# Patient Record
Sex: FEMALE | Race: WHITE | Employment: FULL TIME | URBAN - METROPOLITAN AREA
[De-identification: names, ages, dates, MRNs, and addresses within clinical notes are randomized per-mention and may not be internally consistent; named-entity substitution may affect disease eponyms.]

---

## 2017-08-15 ENCOUNTER — HOSPITAL ENCOUNTER (OUTPATIENT)
Dept: PHYSICAL THERAPY | Age: 62
Discharge: HOME | End: 2017-08-15
Payer: COMMERCIAL

## 2017-08-15 PROCEDURE — 97110 THERAPEUTIC EXERCISES: CPT

## 2017-08-15 PROCEDURE — 97161 PT EVAL LOW COMPLEX 20 MIN: CPT

## 2017-08-15 NOTE — PROGRESS NOTES
[facility] PHYSICAL THERAPY AT [facility] [address] [place], [address] - Phone: [phone]  Fax: [phone] / [address] [facility]  Patient Name: Nina Quiñones : 1955   Medical   Diagnosis: Cervicalgia [M54.2] Treatment Diagnosis: Cervical pain   Onset Date:      Referral Source: Charlotte Good MD Start of Care [facility]): 8/15/2017   Prior Hospitalization: See medical history Provider #: [id number]   Prior Level of Function: ADLs without neck pain or dizziness   Comorbidities: Hep C, renal disease, LBP   Medications: Verified on Patient Summary List     The Plan of Care and following information is based on the information from the initial evaluation.   ===========================================================================================  Assessment / rothman information:   Nina Quiñones is a 58 y.o.  yo female with Dx of Cervicalgia [M54.2]. .  She currently rates neck pain as 8/10 at worst, 4/10 at best, primarily located at base of neck. Dizziness reported x 3 months, worsening with rotation and bending forward. Neck pain is worse with rotation, lifting items and reaching for items [place] at work. Objective Findings:  Cervical ROM: Flx  = 10 deg, Ext = 20deg, Lat Flx; R = 20, L = 25, Rot: R = 65, L =45 . Manual Muscle Testing: 3+/5 MT, LT and ER. Posture: Significant FHP. Special Test:  + Compression and Distraction.  Pt instructed in HEP and will f/u in clinic for PT.  ===========================================================================================  Eval Complexity: History MEDIUM  Complexity : 1-2 comorbidities / personal factors will impact the outcome/ POC ;  Examination  MEDIUM Complexity : 3 Standardized tests and measures addressing body structure, function, activity limitation and / or participation in recreation ; Presentation MEDIUM Complexity : Evolving with changing characteristics ; Decision Making MEDIUM Complexity : FOTO score of 26-74; Overall Complexity MEDIUM  Problem List: pain affecting function, decrease ROM, decrease strength, decrease ADL/ functional abilitiies, decrease activity tolerance, decrease flexibility/ joint mobility and decrease transfer abilities   Treatment Plan may include any combination of the following: Therapeutic exercise, Therapeutic activities, Neuromuscular re-education, Physical agent/modality, Manual therapy, Aquatic therapy, Patient education, Functional mobility training and Stair training  Patient / Family readiness to learn indicated by: asking questions, trying to perform skills and interest  Persons(s) to be included in education: patient (P)  Barriers to Learning/Limitations: no  Measures taken:  None needed   Patient Goal (s): To decrease pain   Rehabilitation Potential: good   Short Term Goals: To be accomplished in  1-2  weeks:  1. Independent with HEP. 2. Decrease max pain 25-50% to assist with return to full schedule at work. 3. Improve FOTO score by 4 points to show functional improvement.  Long Term Goals: To be accomplished in  3-4  weeks:  1. Decrease max pain 50-75% to assist with HEP. 2. Improve FOTO score by 9 points to show functional improvement. 3.  Will rate a +5 on Global Rating of Change and be prepared to DC to HEP. Frequency / Duration:   Patient to be seen  2-3  times per week for 3-4  weeks:  Patient / Caregiver education and instruction: self care and exercises  G-Codes (GP): n/a  Therapist Signature: Danielle Taveras PT Date: 3/76/1757   Certification Period: n/a Time: 4:19 PM   ===========================================================================================  I certify that the above Physical Therapy Services are being furnished while the patient is under my care. I agree with the treatment plan and certify that this therapy is necessary.     Physician Signature:        Date: Time:     Please sign and return to [facility] at [address] or you may fax the signed copy to [phone]. Thank you.

## 2017-08-15 NOTE — PROGRESS NOTES
PHYSICAL THERAPY - DAILY TREATMENT NOTE    Patient Name: Joan [facility]        Date: 8/15/2017  : 1955   YES Patient  Verified  Visit #:   1   of   4  Insurance: Payor: Gavin Peppers / Plan: 50 [facility] / Product Type: Commerical /      In time: 410 Out time: 5   Total Treatment Time: 50     Medicare Time Tracking (below)   Total Timed Codes (min):  15 1:1 Treatment Time:  50     TREATMENT AREA =  Dizziness and neck pain    SUBJECTIVE                                                         See IE            OBJECTIVE    15 min Therapeutic Exercise:  [x]  See flow sheet   Rationale:      increase ROM and increase strength to improve the patients ability to perform ADLs    T/o tx min Patient Education:  YES  Reviewed HEP   [] A and P related to current DX [] LTGs and POC   []  Progressed/Changed HEP based on:         Other Objective/Functional Measures:                                   See IE           ASSESSMENT    [x]  See Initial Evaluation           PLAN    []  Upgrade activities as tolerated YES Continue plan of care   []  Discharge due to :    []  Other: Pt will return for follow up and to initiate POC     Therapist: Darci Moses, PT, [address]    Date: 8/15/2017 Time: 4:17 PM

## 2017-10-17 NOTE — PROGRESS NOTES
[place] PHYSICAL THERAPY AT [facility] [address]. [address]  Phone: [phone]  Fax: [phone] SUMMARY  Patient Name: Saida Urrutia : 1955   Treatment/Medical Diagnosis: Cervicalgia [M54.2]   Referral Source: Juan Luis Duran MD     Date of Initial Visit: 8/15/17 Attended Visits: 1 Missed Visits: 0     SUMMARY OF TREATMENT  Initial evaluation and therapeutic exercise, pt ed. CURRENT STATUS  Unknown. GOAL STATUS  Unable to formally reassess progress toward goals secondary to pt not returning after initial evaluation. RECOMMENDATIONS  Discontinue therapy due to lack of attendance or compliance. If you have any questions/comments please contact us directly at [phone]. Thank you for allowing us to assist in the care of your patient.     Therapist Signature: Clayborne Nageotte, PT Date: 10/17/17   Reporting Period:   8/15/17 Time: 1:29 PM

## 2018-05-04 ENCOUNTER — HOSPITAL ENCOUNTER (OUTPATIENT)
Dept: LAB | Age: 63
Discharge: HOME | End: 2018-05-04
Payer: COMMERCIAL

## 2018-05-04 ENCOUNTER — HOSPITAL ENCOUNTER (OUTPATIENT)
Dept: OTHER | Age: 63
Discharge: HOME | End: 2018-05-04
Payer: COMMERCIAL

## 2018-05-04 ENCOUNTER — HOSPITAL ENCOUNTER (OUTPATIENT)
Dept: LAB | Age: 63
Discharge: HOME | End: 2018-05-04

## 2018-05-04 ENCOUNTER — HOSPITAL ENCOUNTER (OUTPATIENT)
Dept: PREADMISSION TESTING | Age: 63
Discharge: HOME | End: 2018-05-04
Payer: COMMERCIAL

## 2018-05-04 DIAGNOSIS — Z01.818 PRE-OP TESTING: ICD-10-CM

## 2018-05-04 DIAGNOSIS — Z01.811 PRE-OP CHEST EXAM: ICD-10-CM

## 2018-05-04 LAB
ABO + RH BLD: NORMAL
ATRIAL RATE: 50 BPM
BLOOD GROUP ANTIBODIES SERPL: NORMAL
CALCULATED P AXIS, ECG09: 78 DEGREES
CALCULATED R AXIS, ECG10: 73 DEGREES
CALCULATED T AXIS, ECG11: 78 DEGREES
DIAGNOSIS, 93000: NORMAL
P-R INTERVAL, ECG05: 150 MS
Q-T INTERVAL, ECG07: 490 MS
QRS DURATION, ECG06: 84 MS
QTC CALCULATION (BEZET), ECG08: 446 MS
SENTARA SPECIMEN COL,SENBCF: NORMAL
SPECIMEN EXP DATE BLD: NORMAL
VENTRICULAR RATE, ECG03: 50 BPM

## 2018-05-04 PROCEDURE — 86900 BLOOD TYPING SEROLOGIC ABO: CPT | Performed by: NEUROLOGICAL SURGERY

## 2018-05-04 PROCEDURE — 93005 ELECTROCARDIOGRAM TRACING: CPT

## 2018-05-04 PROCEDURE — 71046 X-RAY EXAM CHEST 2 VIEWS: CPT

## 2018-05-04 PROCEDURE — 99001 SPECIMEN HANDLING PT-LAB: CPT | Performed by: NEUROLOGICAL SURGERY

## 2018-05-04 RX ORDER — ESCITALOPRAM OXALATE 20 MG/1
20 TABLET ORAL DAILY
COMMUNITY
End: 2021-01-12

## 2018-05-04 RX ORDER — OMEPRAZOLE 40 MG/1
40 CAPSULE, DELAYED RELEASE ORAL DAILY
COMMUNITY
End: 2022-09-28

## 2018-05-04 NOTE — PERIOP NOTES
PAT - SURGICAL PRE-ADMISSION INSTRUCTIONS    NAME:  Be Cochran                                                          TODAY'S DATE:  5/4/2018    SURGERY DATE:  5/9/2018                                  SURGERY ARRIVAL TIME:   0530    1. Do NOT eat or drink anything, including candy or gum, after MIDNIGHT on 05/08/18 , unless you have specific instructions from your Surgeon or Anesthesia Provider to do so. 2. No smoking on the day of surgery. 3. No alcohol 24 hours prior to the day of surgery. 4. No recreational drugs for one week prior to the day of surgery. 5. Leave all valuables, including money/purse, at home. 6. Remove all jewelry, nail polish, makeup (including mascara); no lotions, powders, deodorant, or perfume/cologne/after shave. 7. Glasses/Contact lenses and Dentures may be worn to the hospital.  They will be removed prior to surgery. 8. Call your doctor if symptoms of a cold or illness develop within 24 ours prior to surgery. 9. AN ADULT MUST DRIVE YOU HOME AFTER OUTPATIENT SURGERY. 10. If you are having an OUTPATIENT procedure, please make arrangements for a responsible adult to be with you for 24 hours after your surgery. 11. If you are admitted to the hospital, you will be assigned to a bed after surgery is complete. Normally a family member will not be able to see you until you are in your assigned bed. 15. Family is encouraged to accompany you to the hospital.  We ask visitors in the treatment area to be limited to ONE person at a time to ensure patient privacy. EXCEPTIONS WILL BE MADE AS NEEDED. 15. Children under 12 are discouraged from entering the treatment area and need to be supervised by an adult when in the waiting room. Special Instructions:    NONE. Patient Prep:    use CHG solution    These surgical instructions were reviewed with Wilver Quezada during the PAT visit. A printed copy of the instructions was provided to Wilver Quezada. Directions:   On the morning of surgery, please go to the [address]. Enter the building from the [facility] entrance, 1st floor (next to the Emergency Room entrance). Take the elevator to the 2nd floor. Sign in at the Registration Desk.     If you have any questions and/or concerns, please do not hesitate to call:  (Prior to the day of surgery)  [facility] unit:  [phone]  (Day of surgery)  [facility] unit:  [phone]

## 2018-05-08 ENCOUNTER — ANESTHESIA EVENT (OUTPATIENT)
Dept: SURGERY | Age: 63
DRG: 454 | End: 2018-05-08
Payer: COMMERCIAL

## 2018-05-09 ENCOUNTER — APPOINTMENT (OUTPATIENT)
Dept: GENERAL RADIOLOGY | Age: 63
DRG: 454 | End: 2018-05-09
Attending: NEUROLOGICAL SURGERY
Payer: COMMERCIAL

## 2018-05-09 ENCOUNTER — ANESTHESIA (OUTPATIENT)
Dept: SURGERY | Age: 63
DRG: 454 | End: 2018-05-09
Payer: COMMERCIAL

## 2018-05-09 ENCOUNTER — HOSPITAL ENCOUNTER (INPATIENT)
Age: 63
LOS: 4 days | Discharge: HOME | DRG: 454 | End: 2018-05-13
Attending: NEUROLOGICAL SURGERY | Admitting: NEUROLOGICAL SURGERY
Payer: COMMERCIAL

## 2018-05-09 PROBLEM — G95.9 CERVICAL MYELOPATHY (HCC): Status: ACTIVE | Noted: 2018-05-09

## 2018-05-09 PROCEDURE — 77030013797 HC KT TRNSDUC PRSSR EDWD -A: Performed by: ANESTHESIOLOGY

## 2018-05-09 PROCEDURE — 74011000250 HC RX REV CODE- 250

## 2018-05-09 PROCEDURE — 77030005401 HC CATH RAD ARRO -A: Performed by: ANESTHESIOLOGY

## 2018-05-09 PROCEDURE — 77030037878 HC DRSG MEPILEX >48IN BORD MOLN -B

## 2018-05-09 PROCEDURE — 77030029099 HC BN WAX SSPC -A: Performed by: NEUROLOGICAL SURGERY

## 2018-05-09 PROCEDURE — 74011250636 HC RX REV CODE- 250/636

## 2018-05-09 PROCEDURE — 77030008462 HC STPLR SKN PROX J&J -A: Performed by: NEUROLOGICAL SURGERY

## 2018-05-09 PROCEDURE — 77030003666 HC NDL SPINAL BD -A: Performed by: NEUROLOGICAL SURGERY

## 2018-05-09 PROCEDURE — L0172 CERV COL SR FOAM 2PC PRE OTS: HCPCS | Performed by: NEUROLOGICAL SURGERY

## 2018-05-09 PROCEDURE — 77030003028 HC SUT VCRL J&J -A: Performed by: NEUROLOGICAL SURGERY

## 2018-05-09 PROCEDURE — 77030034169 HC GRFT BN BIOACTV INTRFC 1G BSTEB -F: Performed by: NEUROLOGICAL SURGERY

## 2018-05-09 PROCEDURE — 4A11X4G MONITORING OF PERIPHERAL NERVOUS ELECTRICAL ACTIVITY, INTRAOPERATIVE, EXTERNAL APPROACH: ICD-10-PCS | Performed by: NEUROLOGICAL SURGERY

## 2018-05-09 PROCEDURE — 74011250636 HC RX REV CODE- 250/636: Performed by: NURSE ANESTHETIST, CERTIFIED REGISTERED

## 2018-05-09 PROCEDURE — 76010000182 HC OR TIME 7.5 TO 8 HR INTENSV-TIER 1: Performed by: NEUROLOGICAL SURGERY

## 2018-05-09 PROCEDURE — C1713 ANCHOR/SCREW BN/BN,TIS/BN: HCPCS | Performed by: NEUROLOGICAL SURGERY

## 2018-05-09 PROCEDURE — 76060000046 HC ANESTHESIA 7.5 TO 8 HR: Performed by: NEUROLOGICAL SURGERY

## 2018-05-09 PROCEDURE — 0RG20A0 FUSION OF 2 OR MORE CERVICAL VERTEBRAL JOINTS WITH INTERBODY FUSION DEVICE, ANTERIOR APPROACH, ANTERIOR COLUMN, OPEN APPROACH: ICD-10-PCS | Performed by: NEUROLOGICAL SURGERY

## 2018-05-09 PROCEDURE — 77030012602 HC SPNG PTTY NEUR J&J -B: Performed by: NEUROLOGICAL SURGERY

## 2018-05-09 PROCEDURE — 77030029372 HC ADH SKN CLSR PRINEO J&J -C: Performed by: NEUROLOGICAL SURGERY

## 2018-05-09 PROCEDURE — 0RT30ZZ RESECTION OF CERVICAL VERTEBRAL DISC, OPEN APPROACH: ICD-10-PCS | Performed by: NEUROLOGICAL SURGERY

## 2018-05-09 PROCEDURE — 77030030722 HC PIN SKULL MAYFLD INLC -B: Performed by: NEUROLOGICAL SURGERY

## 2018-05-09 PROCEDURE — C1729 CATH, DRAINAGE: HCPCS | Performed by: NEUROLOGICAL SURGERY

## 2018-05-09 PROCEDURE — 0RG2071 FUSION OF 2 OR MORE CERVICAL VERTEBRAL JOINTS WITH AUTOLOGOUS TISSUE SUBSTITUTE, POSTERIOR APPROACH, POSTERIOR COLUMN, OPEN APPROACH: ICD-10-PCS | Performed by: NEUROLOGICAL SURGERY

## 2018-05-09 PROCEDURE — 74011250636 HC RX REV CODE- 250/636: Performed by: NEUROLOGICAL SURGERY

## 2018-05-09 PROCEDURE — 77030011267 HC ELECTRD BLD COVD -A: Performed by: NEUROLOGICAL SURGERY

## 2018-05-09 PROCEDURE — 77030034694 HC SCPL CANADY PLSM DISP USMD -E: Performed by: NEUROLOGICAL SURGERY

## 2018-05-09 PROCEDURE — 77030008477 HC STYL SATN SLP COVD -A: Performed by: ANESTHESIOLOGY

## 2018-05-09 PROCEDURE — 77030032490 HC SLV COMPR SCD KNE COVD -B: Performed by: NEUROLOGICAL SURGERY

## 2018-05-09 PROCEDURE — 74011000250 HC RX REV CODE- 250: Performed by: NEUROLOGICAL SURGERY

## 2018-05-09 PROCEDURE — 77030005518 HC CATH URETH FOL 2W BARD -B: Performed by: NEUROLOGICAL SURGERY

## 2018-05-09 PROCEDURE — 77030013079 HC BLNKT BAIR HGGR 3M -A: Performed by: ANESTHESIOLOGY

## 2018-05-09 PROCEDURE — 74011250637 HC RX REV CODE- 250/637: Performed by: NURSE ANESTHETIST, CERTIFIED REGISTERED

## 2018-05-09 PROCEDURE — 77030018673: Performed by: NEUROLOGICAL SURGERY

## 2018-05-09 PROCEDURE — 74011000272 HC RX REV CODE- 272: Performed by: NEUROLOGICAL SURGERY

## 2018-05-09 PROCEDURE — 77030002933 HC SUT MCRYL J&J -A: Performed by: NEUROLOGICAL SURGERY

## 2018-05-09 PROCEDURE — 77030004391 HC BUR FLUT MEDT -C: Performed by: NEUROLOGICAL SURGERY

## 2018-05-09 PROCEDURE — 77010033678 HC OXYGEN DAILY

## 2018-05-09 PROCEDURE — 77030003029 HC SUT VCRL J&J -B: Performed by: NEUROLOGICAL SURGERY

## 2018-05-09 PROCEDURE — 77030011640 HC PAD GRND REM COVD -A: Performed by: NEUROLOGICAL SURGERY

## 2018-05-09 PROCEDURE — 77030009868 HC PIN DISTR CASPR AESC -B: Performed by: NEUROLOGICAL SURGERY

## 2018-05-09 PROCEDURE — 77030008683 HC TU ET CUF COVD -A: Performed by: ANESTHESIOLOGY

## 2018-05-09 PROCEDURE — 77030021678 HC GLIDESCP STAT DISP VERT -B: Performed by: ANESTHESIOLOGY

## 2018-05-09 PROCEDURE — 77030014647 HC SEAL FBRN TISSL BAXT -D: Performed by: NEUROLOGICAL SURGERY

## 2018-05-09 PROCEDURE — 72040 X-RAY EXAM NECK SPINE 2-3 VW: CPT

## 2018-05-09 PROCEDURE — 77030018846 HC SOL IRR STRL H20 ICUM -A: Performed by: NEUROLOGICAL SURGERY

## 2018-05-09 PROCEDURE — 77030030105 HC BIT DRL VUEPNT NUVA -B: Performed by: NEUROLOGICAL SURGERY

## 2018-05-09 PROCEDURE — 77030012406 HC DRN WND PENRS BARD -A: Performed by: NEUROLOGICAL SURGERY

## 2018-05-09 PROCEDURE — 74011250637 HC RX REV CODE- 250/637: Performed by: NEUROLOGICAL SURGERY

## 2018-05-09 PROCEDURE — C9113 INJ PANTOPRAZOLE SODIUM, VIA: HCPCS | Performed by: NEUROLOGICAL SURGERY

## 2018-05-09 PROCEDURE — 77030018390 HC SPNG HEMSTAT2 J&J -B: Performed by: NEUROLOGICAL SURGERY

## 2018-05-09 PROCEDURE — 77030002996 HC SUT SLK J&J -A: Performed by: NEUROLOGICAL SURGERY

## 2018-05-09 PROCEDURE — 77030034171 HC GRFT COLGN SCAFLD BSTE -G: Performed by: NEUROLOGICAL SURGERY

## 2018-05-09 PROCEDURE — 76210000016 HC OR PH I REC 1 TO 1.5 HR: Performed by: NEUROLOGICAL SURGERY

## 2018-05-09 PROCEDURE — 77030031879 HC SPCR SPN LORDTC CRNT NUVA -H1: Performed by: NEUROLOGICAL SURGERY

## 2018-05-09 PROCEDURE — 65610000006 HC RM INTENSIVE CARE

## 2018-05-09 PROCEDURE — 77030018836 HC SOL IRR NACL ICUM -A: Performed by: NEUROLOGICAL SURGERY

## 2018-05-09 PROCEDURE — 77030013079 HC BLNKT BAIR HGGR 3M -A: Performed by: NEUROLOGICAL SURGERY

## 2018-05-09 DEVICE — GRAFT BNE SUB 7.2CC W15XL50MM MINERALIZED CLLGN SCFLD: Type: IMPLANTABLE DEVICE | Site: ANTERIOR CERVICAL | Status: FUNCTIONAL

## 2018-05-09 DEVICE — IMPLANTABLE DEVICE: Type: IMPLANTABLE DEVICE | Site: POSTERIOR CERVICAL | Status: FUNCTIONAL

## 2018-05-09 DEVICE — SCREW SPNL L14MM DIA4MM ANT CERV INTLOK COROENT: Type: IMPLANTABLE DEVICE | Site: ANTERIOR CERVICAL | Status: FUNCTIONAL

## 2018-05-09 DEVICE — CAGE SPNL SM W14XH7XL17MM 7DEG ANTR CERV INTBDY FUS LORDTC: Type: IMPLANTABLE DEVICE | Site: ANTERIOR CERVICAL | Status: FUNCTIONAL

## 2018-05-09 DEVICE — GRAFT BNE SUB 7.5GM PTTY SYN SIGNAFUSE: Type: IMPLANTABLE DEVICE | Site: POSTERIOR CERVICAL | Status: FUNCTIONAL

## 2018-05-09 DEVICE — INTERFACE IS A SYNTHETIC BIOACTIVE BONE GRAFT FOR USE IN THE REPAIR OF OSSEOUS DEFECTS. IT IS SUPPLIED AS IRREGULAR SYNTHETIC GRANULES OF BIOACTIVE GLASS (45S5 BIOGLASS), SIZED FROM 200 MICRONS TO 420 MICRONS. WHEN IMPLANTED IN LIVING TISSUE, THE MATERIAL UNDERGOES A TIME DEPENDENT SURFACE MODIFICATION. THE SURFACE REACTION RESULTS IN THE FORMATION OF A CALCIUM PHOSPHATE LAYER, WHICH IS EQUIVALENT IN COMPOSITION AND STRUCTURE TO THE HYDROXYAPATITE FOUND IN BONE MINERAL. THE BIOLOGICAL APATITE LAYER OF THE GRANULES PROVIDES AN OSTEOCONDUCTIVE SCAFFOLD FOR THE GENERATION OF NEW OSSEOUS TISSUE. NEW BONE INFILTRATES AROUND THE GRANULES ALLOWING THE REPAIR OF THE DEFECT AS THE GRANULES ARE ABSORBED.
Type: IMPLANTABLE DEVICE | Site: POSTERIOR CERVICAL | Status: FUNCTIONAL
Brand: INTERFACE

## 2018-05-09 DEVICE — ROD SPNL L80MM PRECONTOURED VUEPOINT II: Type: IMPLANTABLE DEVICE | Site: POSTERIOR CERVICAL | Status: FUNCTIONAL

## 2018-05-09 DEVICE — SCREW SPNL L12MM DIA4MM ANTR CERV ST FOR SM INTLOK SYS: Type: IMPLANTABLE DEVICE | Site: ANTERIOR CERVICAL | Status: FUNCTIONAL

## 2018-05-09 DEVICE — SCREW SPNL L L12MM DIA3.5MM POST OCCIPITOCERVICOTHORACIC: Type: IMPLANTABLE DEVICE | Site: POSTERIOR CERVICAL | Status: FUNCTIONAL

## 2018-05-09 RX ORDER — PANTOPRAZOLE SODIUM 40 MG/1
40 TABLET, DELAYED RELEASE ORAL DAILY
Status: DISCONTINUED | OUTPATIENT
Start: 2018-05-10 | End: 2018-05-09 | Stop reason: SDUPTHER

## 2018-05-09 RX ORDER — HYDROMORPHONE HYDROCHLORIDE 2 MG/ML
0.5 INJECTION, SOLUTION INTRAMUSCULAR; INTRAVENOUS; SUBCUTANEOUS
Status: DISCONTINUED | OUTPATIENT
Start: 2018-05-09 | End: 2018-05-09 | Stop reason: HOSPADM

## 2018-05-09 RX ORDER — LIDOCAINE HYDROCHLORIDE 10 MG/ML
0.1 INJECTION INFILTRATION; PERINEURAL AS NEEDED
Status: DISCONTINUED | OUTPATIENT
Start: 2018-05-09 | End: 2018-05-09 | Stop reason: HOSPADM

## 2018-05-09 RX ORDER — CEFAZOLIN SODIUM 2 G/50ML
2 SOLUTION INTRAVENOUS EVERY 8 HOURS
Status: COMPLETED | OUTPATIENT
Start: 2018-05-09 | End: 2018-05-10

## 2018-05-09 RX ORDER — NEOSTIGMINE METHYLSULFATE 1 MG/ML
INJECTION INTRAVENOUS AS NEEDED
Status: DISCONTINUED | OUTPATIENT
Start: 2018-05-09 | End: 2018-05-09 | Stop reason: HOSPADM

## 2018-05-09 RX ORDER — SCOLOPAMINE TRANSDERMAL SYSTEM 1 MG/1
1 PATCH, EXTENDED RELEASE TRANSDERMAL ONCE
Status: COMPLETED | OUTPATIENT
Start: 2018-05-09 | End: 2018-05-12

## 2018-05-09 RX ORDER — IBUPROFEN 200 MG
1 TABLET ORAL EVERY 24 HOURS
Status: DISCONTINUED | OUTPATIENT
Start: 2018-05-09 | End: 2018-05-13 | Stop reason: HOSPADM

## 2018-05-09 RX ORDER — ESCITALOPRAM OXALATE 10 MG/1
20 TABLET ORAL DAILY
Status: DISCONTINUED | OUTPATIENT
Start: 2018-05-10 | End: 2018-05-13 | Stop reason: HOSPADM

## 2018-05-09 RX ORDER — SODIUM CHLORIDE, SODIUM LACTATE, POTASSIUM CHLORIDE, CALCIUM CHLORIDE 600; 310; 30; 20 MG/100ML; MG/100ML; MG/100ML; MG/100ML
75 INJECTION, SOLUTION INTRAVENOUS CONTINUOUS
Status: DISCONTINUED | OUTPATIENT
Start: 2018-05-09 | End: 2018-05-10

## 2018-05-09 RX ORDER — SODIUM CHLORIDE 0.9 % (FLUSH) 0.9 %
5-10 SYRINGE (ML) INJECTION AS NEEDED
Status: DISCONTINUED | OUTPATIENT
Start: 2018-05-09 | End: 2018-05-13 | Stop reason: HOSPADM

## 2018-05-09 RX ORDER — ONDANSETRON 2 MG/ML
4 INJECTION INTRAMUSCULAR; INTRAVENOUS
Status: DISCONTINUED | OUTPATIENT
Start: 2018-05-09 | End: 2018-05-13 | Stop reason: HOSPADM

## 2018-05-09 RX ORDER — EPHEDRINE SULFATE 50 MG/ML
INJECTION, SOLUTION INTRAVENOUS AS NEEDED
Status: DISCONTINUED | OUTPATIENT
Start: 2018-05-09 | End: 2018-05-09 | Stop reason: HOSPADM

## 2018-05-09 RX ORDER — NALOXONE HYDROCHLORIDE 0.4 MG/ML
0.4 INJECTION, SOLUTION INTRAMUSCULAR; INTRAVENOUS; SUBCUTANEOUS AS NEEDED
Status: DISCONTINUED | OUTPATIENT
Start: 2018-05-09 | End: 2018-05-13 | Stop reason: HOSPADM

## 2018-05-09 RX ORDER — DEXAMETHASONE SODIUM PHOSPHATE 4 MG/ML
INJECTION, SOLUTION INTRA-ARTICULAR; INTRALESIONAL; INTRAMUSCULAR; INTRAVENOUS; SOFT TISSUE AS NEEDED
Status: DISCONTINUED | OUTPATIENT
Start: 2018-05-09 | End: 2018-05-09 | Stop reason: HOSPADM

## 2018-05-09 RX ORDER — SUCCINYLCHOLINE CHLORIDE 20 MG/ML
INJECTION INTRAMUSCULAR; INTRAVENOUS AS NEEDED
Status: DISCONTINUED | OUTPATIENT
Start: 2018-05-09 | End: 2018-05-09 | Stop reason: HOSPADM

## 2018-05-09 RX ORDER — PROPOFOL 10 MG/ML
VIAL (ML) INTRAVENOUS
Status: DISCONTINUED | OUTPATIENT
Start: 2018-05-09 | End: 2018-05-09 | Stop reason: HOSPADM

## 2018-05-09 RX ORDER — LIDOCAINE HYDROCHLORIDE 20 MG/ML
INJECTION, SOLUTION EPIDURAL; INFILTRATION; INTRACAUDAL; PERINEURAL AS NEEDED
Status: DISCONTINUED | OUTPATIENT
Start: 2018-05-09 | End: 2018-05-09 | Stop reason: HOSPADM

## 2018-05-09 RX ORDER — FENTANYL CITRATE 50 UG/ML
50 INJECTION, SOLUTION INTRAMUSCULAR; INTRAVENOUS AS NEEDED
Status: DISCONTINUED | OUTPATIENT
Start: 2018-05-09 | End: 2018-05-09 | Stop reason: HOSPADM

## 2018-05-09 RX ORDER — GLYCOPYRROLATE 0.2 MG/ML
INJECTION INTRAMUSCULAR; INTRAVENOUS AS NEEDED
Status: DISCONTINUED | OUTPATIENT
Start: 2018-05-09 | End: 2018-05-09 | Stop reason: HOSPADM

## 2018-05-09 RX ORDER — FENTANYL CITRATE 50 UG/ML
INJECTION, SOLUTION INTRAMUSCULAR; INTRAVENOUS AS NEEDED
Status: DISCONTINUED | OUTPATIENT
Start: 2018-05-09 | End: 2018-05-09 | Stop reason: HOSPADM

## 2018-05-09 RX ORDER — ONDANSETRON 2 MG/ML
4 INJECTION INTRAMUSCULAR; INTRAVENOUS ONCE
Status: DISCONTINUED | OUTPATIENT
Start: 2018-05-09 | End: 2018-05-09 | Stop reason: HOSPADM

## 2018-05-09 RX ORDER — SODIUM CHLORIDE 0.9 % (FLUSH) 0.9 %
5-10 SYRINGE (ML) INJECTION EVERY 8 HOURS
Status: DISCONTINUED | OUTPATIENT
Start: 2018-05-09 | End: 2018-05-13 | Stop reason: HOSPADM

## 2018-05-09 RX ORDER — OXYCODONE AND ACETAMINOPHEN 5; 325 MG/1; MG/1
1-2 TABLET ORAL
Status: DISCONTINUED | OUTPATIENT
Start: 2018-05-09 | End: 2018-05-13 | Stop reason: HOSPADM

## 2018-05-09 RX ORDER — VANCOMYCIN HYDROCHLORIDE 1 G/20ML
INJECTION, POWDER, LYOPHILIZED, FOR SOLUTION INTRAVENOUS AS NEEDED
Status: DISCONTINUED | OUTPATIENT
Start: 2018-05-09 | End: 2018-05-09 | Stop reason: HOSPADM

## 2018-05-09 RX ORDER — MIDAZOLAM HYDROCHLORIDE 1 MG/ML
INJECTION, SOLUTION INTRAMUSCULAR; INTRAVENOUS AS NEEDED
Status: DISCONTINUED | OUTPATIENT
Start: 2018-05-09 | End: 2018-05-09 | Stop reason: HOSPADM

## 2018-05-09 RX ORDER — VECURONIUM BROMIDE FOR INJECTION 1 MG/ML
INJECTION, POWDER, LYOPHILIZED, FOR SOLUTION INTRAVENOUS AS NEEDED
Status: DISCONTINUED | OUTPATIENT
Start: 2018-05-09 | End: 2018-05-09 | Stop reason: HOSPADM

## 2018-05-09 RX ORDER — ONDANSETRON 2 MG/ML
INJECTION INTRAMUSCULAR; INTRAVENOUS AS NEEDED
Status: DISCONTINUED | OUTPATIENT
Start: 2018-05-09 | End: 2018-05-09 | Stop reason: HOSPADM

## 2018-05-09 RX ORDER — HYDROMORPHONE HYDROCHLORIDE 1 MG/ML
1 INJECTION, SOLUTION INTRAMUSCULAR; INTRAVENOUS; SUBCUTANEOUS
Status: DISCONTINUED | OUTPATIENT
Start: 2018-05-09 | End: 2018-05-12 | Stop reason: RX

## 2018-05-09 RX ORDER — ACETAMINOPHEN 325 MG/1
650 TABLET ORAL
Status: DISCONTINUED | OUTPATIENT
Start: 2018-05-09 | End: 2018-05-13 | Stop reason: HOSPADM

## 2018-05-09 RX ORDER — FAMOTIDINE 20 MG/1
20 TABLET, FILM COATED ORAL ONCE
Status: COMPLETED | OUTPATIENT
Start: 2018-05-09 | End: 2018-05-09

## 2018-05-09 RX ORDER — DEXAMETHASONE SODIUM PHOSPHATE 4 MG/ML
4 INJECTION, SOLUTION INTRA-ARTICULAR; INTRALESIONAL; INTRAMUSCULAR; INTRAVENOUS; SOFT TISSUE EVERY 8 HOURS
Status: COMPLETED | OUTPATIENT
Start: 2018-05-09 | End: 2018-05-10

## 2018-05-09 RX ORDER — CEFAZOLIN SODIUM 1 G/3ML
INJECTION, POWDER, FOR SOLUTION INTRAMUSCULAR; INTRAVENOUS AS NEEDED
Status: DISCONTINUED | OUTPATIENT
Start: 2018-05-09 | End: 2018-05-09 | Stop reason: HOSPADM

## 2018-05-09 RX ORDER — OMEPRAZOLE 20 MG/1
40 CAPSULE, DELAYED RELEASE ORAL DAILY
Status: DISCONTINUED | OUTPATIENT
Start: 2018-05-10 | End: 2018-05-09

## 2018-05-09 RX ORDER — SODIUM CHLORIDE, SODIUM LACTATE, POTASSIUM CHLORIDE, CALCIUM CHLORIDE 600; 310; 30; 20 MG/100ML; MG/100ML; MG/100ML; MG/100ML
INJECTION, SOLUTION INTRAVENOUS
Status: DISCONTINUED | OUTPATIENT
Start: 2018-05-09 | End: 2018-05-09 | Stop reason: HOSPADM

## 2018-05-09 RX ORDER — CYCLOBENZAPRINE HCL 10 MG
10 TABLET ORAL
Status: DISCONTINUED | OUTPATIENT
Start: 2018-05-09 | End: 2018-05-13 | Stop reason: HOSPADM

## 2018-05-09 RX ORDER — SODIUM CHLORIDE, SODIUM LACTATE, POTASSIUM CHLORIDE, CALCIUM CHLORIDE 600; 310; 30; 20 MG/100ML; MG/100ML; MG/100ML; MG/100ML
50 INJECTION, SOLUTION INTRAVENOUS CONTINUOUS
Status: DISCONTINUED | OUTPATIENT
Start: 2018-05-09 | End: 2018-05-09 | Stop reason: HOSPADM

## 2018-05-09 RX ORDER — PROPOFOL 10 MG/ML
INJECTION, EMULSION INTRAVENOUS AS NEEDED
Status: DISCONTINUED | OUTPATIENT
Start: 2018-05-09 | End: 2018-05-09 | Stop reason: HOSPADM

## 2018-05-09 RX ADMIN — DEXAMETHASONE SODIUM PHOSPHATE 4 MG: 4 INJECTION, SOLUTION INTRAMUSCULAR; INTRAVENOUS at 20:38

## 2018-05-09 RX ADMIN — EPHEDRINE SULFATE 10 MG: 50 INJECTION, SOLUTION INTRAVENOUS at 08:32

## 2018-05-09 RX ADMIN — OXYCODONE HYDROCHLORIDE AND ACETAMINOPHEN 2 TABLET: 5; 325 TABLET ORAL at 22:50

## 2018-05-09 RX ADMIN — DEXAMETHASONE SODIUM PHOSPHATE 8 MG: 4 INJECTION, SOLUTION INTRA-ARTICULAR; INTRALESIONAL; INTRAMUSCULAR; INTRAVENOUS; SOFT TISSUE at 08:04

## 2018-05-09 RX ADMIN — EPHEDRINE SULFATE 10 MG: 50 INJECTION, SOLUTION INTRAVENOUS at 13:01

## 2018-05-09 RX ADMIN — GLYCOPYRROLATE 0.4 MG: 0.2 INJECTION INTRAMUSCULAR; INTRAVENOUS at 15:07

## 2018-05-09 RX ADMIN — LIDOCAINE HYDROCHLORIDE 100 MG: 20 INJECTION, SOLUTION EPIDURAL; INFILTRATION; INTRACAUDAL; PERINEURAL at 07:48

## 2018-05-09 RX ADMIN — HYDROMORPHONE HYDROCHLORIDE 0.5 MG: 2 INJECTION INTRAMUSCULAR; INTRAVENOUS; SUBCUTANEOUS at 15:56

## 2018-05-09 RX ADMIN — Medication 75 MCG/KG/MIN: at 08:00

## 2018-05-09 RX ADMIN — NEOSTIGMINE METHYLSULFATE 3 MG: 1 INJECTION INTRAVENOUS at 15:07

## 2018-05-09 RX ADMIN — HYDROMORPHONE HYDROCHLORIDE 1 MG: 1 INJECTION, SOLUTION INTRAMUSCULAR; INTRAVENOUS; SUBCUTANEOUS at 21:30

## 2018-05-09 RX ADMIN — FENTANYL CITRATE 100 MCG: 50 INJECTION, SOLUTION INTRAMUSCULAR; INTRAVENOUS at 08:13

## 2018-05-09 RX ADMIN — FENTANYL CITRATE 25 MCG: 50 INJECTION, SOLUTION INTRAMUSCULAR; INTRAVENOUS at 15:13

## 2018-05-09 RX ADMIN — SODIUM CHLORIDE, SODIUM LACTATE, POTASSIUM CHLORIDE, CALCIUM CHLORIDE: 600; 310; 30; 20 INJECTION, SOLUTION INTRAVENOUS at 08:00

## 2018-05-09 RX ADMIN — PROPOFOL 100 MG: 10 INJECTION, EMULSION INTRAVENOUS at 12:27

## 2018-05-09 RX ADMIN — CEFAZOLIN SODIUM 2 G: 2 SOLUTION INTRAVENOUS at 17:30

## 2018-05-09 RX ADMIN — FAMOTIDINE 20 MG: 20 TABLET ORAL at 07:00

## 2018-05-09 RX ADMIN — FENTANYL CITRATE 50 MCG: 50 INJECTION, SOLUTION INTRAMUSCULAR; INTRAVENOUS at 15:19

## 2018-05-09 RX ADMIN — PROPOFOL 150 MG: 10 INJECTION, EMULSION INTRAVENOUS at 07:48

## 2018-05-09 RX ADMIN — SUCCINYLCHOLINE CHLORIDE 100 MG: 20 INJECTION INTRAMUSCULAR; INTRAVENOUS at 07:48

## 2018-05-09 RX ADMIN — Medication 10 ML: at 17:35

## 2018-05-09 RX ADMIN — ONDANSETRON 4 MG: 2 INJECTION INTRAMUSCULAR; INTRAVENOUS at 14:49

## 2018-05-09 RX ADMIN — SODIUM CHLORIDE, SODIUM LACTATE, POTASSIUM CHLORIDE, AND CALCIUM CHLORIDE: 600; 310; 30; 20 INJECTION, SOLUTION INTRAVENOUS at 11:21

## 2018-05-09 RX ADMIN — SODIUM CHLORIDE, SODIUM LACTATE, POTASSIUM CHLORIDE, AND CALCIUM CHLORIDE 75 ML/HR: 600; 310; 30; 20 INJECTION, SOLUTION INTRAVENOUS at 07:00

## 2018-05-09 RX ADMIN — VECURONIUM BROMIDE FOR INJECTION 4 MG: 1 INJECTION, POWDER, LYOPHILIZED, FOR SOLUTION INTRAVENOUS at 08:16

## 2018-05-09 RX ADMIN — DEXAMETHASONE SODIUM PHOSPHATE 10 MG: 4 INJECTION, SOLUTION INTRA-ARTICULAR; INTRALESIONAL; INTRAMUSCULAR; INTRAVENOUS; SOFT TISSUE at 11:58

## 2018-05-09 RX ADMIN — FENTANYL CITRATE 25 MCG: 50 INJECTION, SOLUTION INTRAMUSCULAR; INTRAVENOUS at 15:12

## 2018-05-09 RX ADMIN — GLYCOPYRROLATE 0.4 MG: 0.2 INJECTION INTRAMUSCULAR; INTRAVENOUS at 08:06

## 2018-05-09 RX ADMIN — CEFAZOLIN SODIUM 2 G: 2 SOLUTION INTRAVENOUS at 21:40

## 2018-05-09 RX ADMIN — HYDROMORPHONE HYDROCHLORIDE 0.5 MG: 2 INJECTION INTRAMUSCULAR; INTRAVENOUS; SUBCUTANEOUS at 16:10

## 2018-05-09 RX ADMIN — HYDROMORPHONE HYDROCHLORIDE 1 MG: 1 INJECTION, SOLUTION INTRAMUSCULAR; INTRAVENOUS; SUBCUTANEOUS at 17:30

## 2018-05-09 RX ADMIN — CEFAZOLIN SODIUM 2 G: 1 INJECTION, POWDER, FOR SOLUTION INTRAMUSCULAR; INTRAVENOUS at 08:09

## 2018-05-09 RX ADMIN — MIDAZOLAM HYDROCHLORIDE 2 MG: 1 INJECTION, SOLUTION INTRAMUSCULAR; INTRAVENOUS at 07:37

## 2018-05-09 RX ADMIN — Medication 10 ML: at 21:41

## 2018-05-09 RX ADMIN — VECURONIUM BROMIDE FOR INJECTION 4 MG: 1 INJECTION, POWDER, LYOPHILIZED, FOR SOLUTION INTRAVENOUS at 13:08

## 2018-05-09 RX ADMIN — HYDROMORPHONE HYDROCHLORIDE 0.5 MG: 2 INJECTION INTRAMUSCULAR; INTRAVENOUS; SUBCUTANEOUS at 15:45

## 2018-05-09 RX ADMIN — FENTANYL CITRATE 100 MCG: 50 INJECTION, SOLUTION INTRAMUSCULAR; INTRAVENOUS at 07:48

## 2018-05-09 RX ADMIN — SODIUM CHLORIDE 40 MG: 9 INJECTION INTRAMUSCULAR; INTRAVENOUS; SUBCUTANEOUS at 17:30

## 2018-05-09 RX ADMIN — CEFAZOLIN SODIUM 2 G: 1 INJECTION, POWDER, FOR SOLUTION INTRAMUSCULAR; INTRAVENOUS at 11:58

## 2018-05-09 NOTE — PROGRESS NOTES
1713- Assumed care of pt. Pt connected to monitor. A/ox4, KIRK, FC, +sensation  Lungs coarse on 2 L NC  SR on the monitor, pulses palpable, SCDs to BLE  Ab soft, nontender, alicia draining yellow urine  Posterior and anterior Hemovac drains with sanguineous drainage. Cervical collar in place  Pt complaining of 10/10 pain, PRN dilaudid given     1912- Bedside and Verbal shift change report given to Ankit Phillips 86 (oncoming nurse) by Cheri Telles. Eric Lovell RN   (offgoing nurse).  Report included the following information SBAR, Kardex, ED Summary, Intake/Output, MAR, Recent Results and Cardiac Rhythm SR.

## 2018-05-09 NOTE — BRIEF OP NOTE
BRIEF OPERATIVE NOTE procedure one:    Date of Procedure: 5/9/2018   Preoperative Diagnosis: cervical stenosis/instability  Postoperative Diagnosis: cervical stenosis/instability    Procedure(s):  supine/ ANTERIOR c3-4,c4-5,c5-6,c6-7 DISCECTOMY and FUSION with peek arthrodesis of cervical spine( c34, c45, c56 and c67    Surgeon(s) and Role:     * Aurora Quinones MD - Primary         Surgical Assistant: Blayne Choi    Surgical Staff:  Circ-1: Shravan Ferro RN  Circ-Relief: Vik Banuelos RN; Jay Foreman RN  Radiology Technician: Molly Sutton  Scrub Tech-1: Cliff Robledo  Scrub Tech-2: Bianca Guzman  Scrub Tech-Relief: Florentino Jean-Baptiste  Surg Asst-1: Jono Ta  Surg Asst-Relief: Wendell Cooks  Float Staff: Jamie Moreno Time In   Incision Start 6744   Incision Close 1513     Anesthesia: General   Estimated Blood Loss: 200  Specimens: * No specimens in log *   Findings: kyphosis and stenosis   Complications: none  Implants:   Implant Name Type Inv.  Item Serial No.  Lot No. LRB No. Used Action   GRAFT CLLGN SCAFFOLD [id number] -- 2/PK OSTEOMATRIX - [id number]  GRAFT CLLGN SCAFFOLD [id number] -- 2/PK [facility] [id number] N/A 1 Implanted   GRAFT BNE PUTTY BIOACTV 7.5GM -- SIGNAFUSE - [id number]  GRAFT BNE PUTTY BIOACTV 7.5GM -- [address] [facility] [id number] N/A 1 Implanted   GRAFT BNE BIOACTV INTERFC 1GM -- INTERFACE - [id number]  GRAFT BNE BIOACTV INTERFC 1GM -- [address] [id number] N/A 1 Implanted   SPACER LORDTC SI 17X14-7MM -- COROENT - [id number]  SPACER LORDTC SI 17X14-7MM -- COROENT  NUVASIVE 1111 N/A 4 Implanted   SCR BNE SPNE 4.0X14MM --  - [id number]  SCR BNE SPNE 4.0X14MM --   NUVASIVE 1111 N/A 9 Implanted   SCREWS 4.0X13MM     1111 N/A 1 Implanted   SCR SET SPNE HOOK AND TULIP -- VUEPOINT II - [id number]  SCR SET SPNE HOOK AND TULIP -- VUEPOINT II  NUVASIVE 1111 N/A 10 Implanted   SCR BNE SPNE CAREY-ANGL 3.5X12MM -- VUEPOINT II - [id number]  SCR BNE SPNE CAREY-ANGL 3.5X12MM -- VUEPOINT II  NUVASIVE 1111 N/A 3 Implanted   SCR SPNE BNE MULTAXL 3.5X14MM -- VUEPOINT II - [id number]  SCR SPNE BNE MULTAXL 3.5X14MM -- VUEPOINT II  NUVASIVE 1111 N/A 7 Implanted   HOLDEN SPNE PRE-CONTR 80MM -- VUEPOINT II - [id number]   HOLDEN SPNE PRE-CONTR 80MM -- VUEPOINT II   NUVASIVE 1111 N/A 2 Implanted     Fluids; 1500 urine [id number]    Medium hemovac drain

## 2018-05-09 NOTE — PROGRESS NOTES
conducted a pre-surgery visit with Faviola Khris, who is a 58 y.o.,female. The  provided the following Interventions:  Initiated a relationship of care and support. Offered prayer and assurance of continued prayers on patient's behalf. Plan:  Chaplains will continue to follow and will provide pastoral care on an as needed/requested basis.  recommends bedside caregivers page  on duty if patient shows signs of acute spiritual or emotional distress.     [address] [facility]   Staff [address] [facility]   [phone]

## 2018-05-09 NOTE — ANESTHESIA PREPROCEDURE EVALUATION
Anesthetic History   No history of anesthetic complications            Review of Systems / Medical History  Patient summary reviewed and pertinent labs reviewed    Pulmonary  Within defined limits        Smoker         Neuro/Psych   Within defined limits           Cardiovascular                  Exercise tolerance: >4 METS     GI/Hepatic/Renal  Within defined limits   GERD      Liver disease    Comments: Hep c Endo/Other        Morbid obesity     Other Findings   Comments: Documentation of current medication  Current medications obtained, documented and obtained? YES      Risk Factors for Postoperative nausea/vomiting:       History of postoperative nausea/vomiting? NO       Female? YES       Motion sickness? NO       Intended opioid administration for postoperative analgesia? YES      Smoking Abstinence:  Current Smoker? YES  Elective Surgery? YES  Seen preoperatively by anesthesiologist or proxy prior to day of surgery? YES  Pt abstained from smoking 24 hours prior to anesthesia?  NO    Preventive care/screening for High Blood Pressure:  Aged 18 years and older: YES  Screened for high blood pressure: YES  Patients with high blood pressure referred to primary care provider   for BP management: YES                     Physical Exam    Airway  Mallampati: II  TM Distance: 4 - 6 cm  Neck ROM: normal range of motion   Mouth opening: Normal     Cardiovascular    Rhythm: regular  Rate: normal         Dental    Dentition: Full lower dentures and Full upper dentures     Pulmonary  Breath sounds clear to auscultation               Abdominal  GI exam deferred       Other Findings            Anesthetic Plan    ASA: 3  Anesthesia type: general    Monitoring Plan: Arterial line      Induction: Intravenous  Anesthetic plan and risks discussed with: Patient

## 2018-05-09 NOTE — PROGRESS NOTES
1930- assumed care of pt asleep, arousable with minimal stimulation. Neuro check done with off going shift. Neck brace on, drsg visualized and C/D/I. hemavac drains charged and draining. 2000- assessment completed. Visitor at bedside. 2130- dilaudid given for neck pain at incision site 9/10.  2200- pt sleeping. 2250- pt states neck pain unchanged. Ice pack replaced. Percocet 2 tabs given for neck pain 9/10.  2340- pt sleeping, easily arousable. States she got some relief of pain. Reassessment done. 0200- no change in neuro assessment. 0400- labs drawn, reassessment done, no change in neuro. Biggs care done. 0500- complete bath and linen change done. Pt able to do most of bath with minimal assistance. Rebel Cochran- Dr. Rogelio Willingham here to see pt. Labs noted. 0730- Bedside and Verbal shift change report given to Vicente Hayes RN (oncoming nurse) by Emir Deleon RN   (offgoing nurse). Report included the following information SBAR, Kardex, Intake/Output, MAR, Recent Results and Cardiac Rhythm NSR.

## 2018-05-09 NOTE — PERIOP NOTES
Rec'd care of pt from OR via bed. Resp even and unlabored. Productive cough thin white secretions noted and suctioned with yankauer suction. Attached to monitor. VSS. OR, MAR and anesthesia report acknowledged. Will cont to monitor. 1700  TRANSFER - OUT REPORT:    Verbal report given to Shelley Rodriguez RN (name) on Be Cochran  being transferred to [id number] (unit) for routine post - op       Report consisted of patients Situation, Background, Assessment and   Recommendations(SBAR). Information from the following report(s) SBAR, Kardex, OR Summary, Intake/Output, MAR and Cardiac Rhythm sinus rhythm was reviewed with the receiving nurse. Lines:   Peripheral IV 05/09/18 Left Hand (Active)   Site Assessment Clean, dry, & intact 5/9/2018  6:00 PM   Phlebitis Assessment 0 5/9/2018  6:00 PM   Infiltration Assessment 0 5/9/2018  6:00 PM   Dressing Status Clean, dry, & intact 5/9/2018  4:42 PM   Dressing Type Transparent;Tape 5/9/2018  4:42 PM   Hub Color/Line Status Pink;Capped 5/9/2018  4:42 PM   Action Taken Open ports on tubing capped 5/9/2018  3:31 PM   Alcohol Cap Used Yes 5/9/2018  4:42 PM       Peripheral IV 05/09/18 Right Wrist (Active)   Site Assessment Clean, dry, & intact 5/9/2018  6:00 PM   Phlebitis Assessment 0 5/9/2018  6:00 PM   Infiltration Assessment 0 5/9/2018  6:00 PM   Dressing Status Clean, dry, & intact 5/9/2018  4:42 PM   Dressing Type Transparent;Tape 5/9/2018  4:42 PM   Hub Color/Line Status Green; Infusing 5/9/2018  4:42 PM   Action Taken Open ports on tubing capped 5/9/2018  3:31 PM   Alcohol Cap Used Yes 5/9/2018  4:42 PM        Opportunity for questions and clarification was provided.       Patient transported with:   Registered Nurse  Tech

## 2018-05-09 NOTE — ANESTHESIA POSTPROCEDURE EVALUATION
Post-Anesthesia Evaluation & Assessment    Visit Vitals    /84 (BP 1 Location: Left arm, BP Patient Position: At rest;Lying left side; Head of bed elevated (Comment degrees))    Pulse 63    Temp 37.4 °C (99.3 °F)    Resp 19    Ht 5' 5\" (1.651 m)    Wt 73.1 kg (161 lb 3 oz)    SpO2 99%    BMI 26.82 kg/m2       Nausea/Vomiting: no nausea and no vomiting    Pain score (VAS): 4    Post-operative hydration adequate.     Mental status & Level of consciousness: orientation per pre-anesthetic level    Neurological status: moves all extremities, sensation grossly intact    Pulmonary status: airway patent, no supplemental oxygen required    Complications related to anesthesia: none    Additional comments:        Janeth Camacho CRNA  May 9, 2018

## 2018-05-09 NOTE — CONSULTS
[address] [facility]  Hospitalist Division    Consult Note    Patient: Art Centeno MRN: [id number]  [facility]: [id number]    YOB: 1955  Age: 58 y.o.   Sex: female    DOA: 5/9/2018 LOS:  LOS: 0 days        Requesting Physician:  Dr. Tigist Dimas  Reason for Consultation:  Medical Management    Chief Complaint:  Cervical Myelopathy    Assessment/Plan     Patient Active Problem List   Diagnosis Code    Gross hematuria R31.0    Nephrolithiasis N20.0    Ureteral stone N20.1    Gastroesophageal reflux disease K21.9    Chronic viral hepatitis C ([facility] B18.2    Hepatitis C virus infection B19.20    Morbid obesity ([facility] E66.01    Thrombocytopenia ([facility] D69.6    Tobacco dependence syndrome F17.200    Calculus of ureter N20.1    Abdominal pain R10.9    Incisional hernia with obstruction but no gangrene K43.0    Cervical myelopathy (HCC) G95.9       A/P:  Cervical Myelopathy  -  s/p prone/posterior C3-C7 instrumentation, posterior fusion C3-4, C4-5, C5-6 C6-7  - supine/ ANTERIOR c3-4,c4-5,c5-6,c6-7 DISCECTOMY and FUSION with peek arthrodesis of cervical spine, c34, c45, c56 and c67    - Ancef 2 grams TID  - Neuro checks  - Decadron 4 mg TID  - pain control, Flexeril  - pulmonary toilet, encourage ICS  - brace, hemovac drain care TID  - ambulate w/ assist, with brace    Hx of HCV w/ cirrhosis  - pt states she was treated in recent past  - f/u with PCP/GI outpatient    Hx of GERD  - continue Prilosec 40 mg daily     Tobacco abuse  - encourage cessation  - Nicotine patch    Hx of depression  - continue Lexapro    DVT Prophylaxis  - SCD's BLE    We appreciate the consultation for medical management and appreciate being able to be involved with their care during hospitalization.     HPI:     Art Centeno is a 58 y.o. female with a hx of HCV w/ cirrhosis, GERD, pulmonary nodule (followed by pulmonary, plans for CT chest), osteopenia, positive YG (refered to rheumatology), depression, with kyphotic deformity involving C4-5, C5-C6, C6-C7 with a mobile segment at the C3-C4 level. Associated symptoms include numbness, tingling and gait disturbance, reports fall at home. She does not use any assistive devices for ambulation. She has failed physical therapy and pain meds without improvement. She is s/p prone/posterior C3-C7 instrumentation, posterior fusion C3-4, C4-5, C5-6 C6-7, supine/ ANTERIOR c3-4,c4-5,c5-6,c6-7 DISCECTOMY and FUSION with peek arthrodesis of cervical spine, c34, c45, c56 and c67 by Dr. Mauricio Young today. Hx of tobacco abuse 1/2 ppd. Denies ETOH use. Denies hx of HTN, HLD, CAD, MI, CVA/TIA, kidney disease, DM or thyroid disorder.       Past Medical History:   Diagnosis Date    Constipation     GERD (gastroesophageal reflux disease)     Hepatitis C     Kidney stone     Microscopic hematuria     Morbid obesity (HCC)     Temporary low platelet count (HCC)     Tobacco abuse     Ureteral stone     Urgency incontinence     Urgency of urination     UTI (lower urinary tract infection)        Past Surgical History:   Procedure Laterality Date    HX CHOLECYSTECTOMY  09/30/2009    HX COLONOSCOPY      HX HERNIA REPAIR  02/11/2016    HX HYSTERECTOMY      HX PELVIC LAPAROSCOPY      HX UROLOGICAL  07/24/2015    Dr. Marycruz Olvera, SVB, Cystoscopy, Left Retrograde pyelogram, Left Ureteral Stent Placement, Fluoroscopy    HX UROLOGICAL  08/13/2015    Dr. Kailey Mckee, SVB, Cystoscopy, Left Reterograde Pyelogram, Left Ureteroscopy with stone extraction, Left Double-J Ureteral Stent Removal       Family History   Problem Relation Age of Onset    Cancer Mother      Colon    Cancer Son      Colon       Social History     Social History    Marital status:      Spouse name: N/A    Number of children: N/A    Years of education: N/A     Occupational History          Social History Main Topics    Smoking status: Light Tobacco Smoker     Packs/day: 0.25     Years: 40.00     Types: Cigarettes    Smokeless tobacco: Never Used      Comment: 1 cigarette qod    Alcohol use No    Drug use: Yes     Special: Marijuana      Comment: last 4 days ago last used     Sexual activity: Not Asked     Other Topics Concern    None     Social History Narrative       Prior to Admission medications    Medication Sig Start Date End Date Taking? Authorizing Provider   escitalopram oxalate (LEXAPRO) 20 mg tablet Take 20 mg by mouth daily. Yes Historical Provider   omeprazole (PRILOSEC) 40 mg capsule Take 40 mg by mouth daily. Historical Provider       No Known Allergies    Review of Systems  - fever, - chills, - fatigue, - weight loss, - night sweats   - sore throat, - sinus congestion, - lymphadenopathy, - vision changes  - CP, -  palpitations  - dyspnea on exertion, - dyspnea at rest, - cough, - hemoptysis  - nausea, - vomiting, - diarrhea, - abdominal pain, - reflux, - dysphagia  - dysuria, - hematuria, - urinary frequency  - rash, - pruritis  - back pain, - neck pain, - myalgia, - arthralgia  - H/A, - numbness, - tingling, - weakness, - slurred speech    Physical Exam:      Visit Vitals    /69 (BP 1 Location: Left arm, BP Patient Position: At rest)    Pulse (!) 52    Temp 98 °F (36.7 °C)    Resp 18    Ht 5' 5\" (1.651 m)    Wt 73.1 kg (161 lb 3 oz)    SpO2 92%    BMI 26.82 kg/m2       Physical Exam:  Gen: In general, this is a well nourished  female in no acute distress. HEENT: Incision left with hemovac in place  Neck: Supple with midline trachea. CV: RRR without murmur or rub appreciated. Resp:Respirations are unlabored without use of accessory muscles. Lung fields bilaterally without wheezes or rhonchi. Abd: Soft, nontender, non-distended. Hypoactive bowel sounds  Extrem: Extremities are warm, without cyanosis, clubbing or edema. Palpable distal pulses X 4.   Skin: scattered bruises BUE  Neuro: Patient is alert, oriented, and cooperative. No obvious focal defects.  Moves all 4 extremities. Sensation intact.         JOSE Pat  [facility]  Hospitalist Division  Pager:  [phone]  Office:  [phone]

## 2018-05-09 NOTE — PROGRESS NOTES
Problem: Falls - Risk of  Goal: *Absence of Falls  Document Scooby Fall Risk and appropriate interventions in the flowsheet. Outcome: Progressing Towards Goal  Fall Risk Interventions:  Mobility Interventions: Patient to call before getting OOB, Bed/chair exit alarm         Medication Interventions: Bed/chair exit alarm, Patient to call before getting OOB    Elimination Interventions: Bed/chair exit alarm, Call light in reach, Toileting schedule/hourly rounds             Problem: Pressure Injury - Risk of  Goal: *Prevention of pressure injury  Document Matthias Scale and appropriate interventions in the flowsheet. Outcome: Progressing Towards Goal  Pressure Injury Interventions:             Activity Interventions: Increase time out of bed, Pressure redistribution bed/mattress(bed type), PT/OT evaluation    Mobility Interventions: HOB 30 degrees or less    Nutrition Interventions: Document food/fluid/supplement intake

## 2018-05-09 NOTE — OP NOTES
[address] REPORT    Radha Asencio  MR#: [id number]  : 1955  ACCOUNT #: [de-identified]   DATE OF SERVICE: 2018    PREOPERATIVE DIAGNOSES:  Cervical myelopathy, Parker neck deformity, cervical stenosis. POSTOPERATIVE DIAGNOSES:  Cervical myelopathy, Parker neck deformity, cervical stenosis. SURGICAL PROCEDURE:    1. Posterior cervical fusion C3-4, C4-5, C5-6, C6-7.  2.  Cervical instrumentation C3-C7. SURGEON:  Pepe Pink MD    ASSISTANT:  Carol Whalen. ANESTHESIA:  General.    BLOOD LOSS:  100 mL    FLUID REPLACEMENT:  1000 mL     URINE OUTPUT:  019 mL    COMPLICATIONS:  None. SPECIMENS:  None. DRAINS:  Medium Hemovac. IMPLANTS: lateral mass screws and rods    PROCEDURE:  John Paul Chi is a 58-year-old female with a history of osteopenia, smoking, who underwent an anterior reconstruction of her spine earlier today. Because of her history of smoking and soft bone, we discussed further stabilizing her from a posterior approach and she and I both felt that this was an appropriate way to proceed. Following the completion of her first procedure, she had the pin fixation device applied to her head and she was very gently turned onto a Facundo frame. The patient had her arms tucked at her side in a papoose. The cervical spine was kept in a neutral position. Her occiput was shaved to accommodate the surgical incision and she was taped to the table. It should be noted that we had been monitoring neuro electromonitoring throughout her initial case, which remained very stable. Following the repositioning of the patient we repeated her x-ray and continued to monitor and all things were perfect. A wide field was then prepped and draped. An incision was planned, which ran from C2 down to the inferior aspect of C7. PROCEDURES:  1. Cervical instrumentation. With a prepped field, a midline incision was made from C2-C7, was deepened with the plasma Bovie. Paravertebral muscle was reflected off the spine at C7, C6-C5, C4-C3 and part of the spinous process and lamina of C2. Self-retaining retractors were advanced into the wound. Bleeding points were controlled. Each of the lateral masses were carefully dissected out from C3-C7. Once we had exposed the lateral masses, I placed the holes for the lateral mass screws. Finding the medial, lateral and superior and inferior lines, I placed a joseline in the center of each lateral mass utilizing a skin marker. Each entry point was approximately 1 mm medial and inferior to this central point. Prior to drilling with the NuVasive drill, I used the Midas and made a  hole in my entry point at each level. I then had a preset NuVasive drill and we set this drilled up to 12 mm. I drilled each of the lateral masses to 12. I also palpated each of the lateral masses and we had bone at each level. Subsequently, we adjusted drilled up to 14 mm and again I drilled each of the lateral masses and we did not have any bicortical holes. Subsequently, I had planned to short screw the lateral masses of C7 and I put 3.5 x 12 mm screws. I put a 3.5 x 12 on the right side of C6, but the remainder of them were 3.5 x 14. An 80 mm aurora was then connected to the screw heads with locking caps. We irrigated with antibiotic-containing saline. 2.  Fusion of the posterior cervical spine C3-C7. Following the placement of the hardware, we  then proceeded with the fusion. We vigorously decorticated the spinous processes and lamina and lateral aspects and facet complexes at each level. We left the bone dust in the wound. We supplemented this with Signafuse putty and OsteoMatrix. We had a very nice fusion mass. A medium Hemovac drain was then placed and brought out through an inferior stab incision. The self-retaining retractors were then advanced out of the wound.   It should be noted we did verify our hardware localization utilizing fluoroscopy. We also continued to do neuro monitoring at the completion of this case. In fact the neuro monitoring was stable to baseline even at the end of the case. This posterior wound was then closed by reapproximating the muscles in the midline with 0 Vicryl. The fascia was closed with 0 Vicryl. Deep subcu was closed with 0 Vicryl, superficial subcu was closed with 2-0 Vicryl and the skin was closed with a stray lock type of stitch and Dermabond dressing. Subsequently, the drain was secured. The patient was returned to the operating room stretcher where she was allowed to awake, extubated without trouble and taken to the recovery room tolerating her procedure well. All sponge and needle counts were correct and there were no complications.       MD Radha Mukherjee / Arturo Lopes  D: 05/09/2018 16:17     T: 05/09/2018 16:55  JOB #: [id number]

## 2018-05-09 NOTE — IP AVS SNAPSHOT
Reji Holland 
 
 
 [address] [place] [address] Patient: Wilma Robins MRN: [id number] PAT:8/02/3411 A check joseline indicates which time of day the medication should be taken. My Medications CONTINUE taking these medications Instructions Each Dose to Equal  
 Morning Noon Evening Bedtime LEXAPRO 20 mg tablet Generic drug:  escitalopram oxalate Your last dose was: Your next dose is: Take 20 mg by mouth daily. 20 mg  
    
   
   
   
  
 omeprazole 40 mg capsule Commonly known as:  PRILOSEC Your last dose was: Your next dose is: Take 40 mg by mouth daily.   
 40 mg

## 2018-05-09 NOTE — BRIEF OP NOTE
BRIEF OPERATIVE NOTE- procedure 2    Date of Procedure: 5/9/2018   Preoperative Diagnosis: cervical stenosis/instability  Postoperative Diagnosis: cervical stenosis/instability    Procedure(s):    prone/posterior c3-c7 instrumentation, posterior fusion c34, c45, c56 c67  Surgeon(s) and Role:     * Gregg Weaver MD - Primary         Surgical Assistant: Hafsa Devine    Surgical Staff:  Circ-1: Lexy Lanrdum RN  Circ-Relief: Kortney Lorenzo RN; Zeeshan Cabezas RN  Radiology Technician: Lynette Fatima  Scrub Tech-1: Marcy Corey  Scrub Tech-2: Lowell Huizar  Scrub Tech-Relief: Michel Boot  Surg Asst-1: Racheal Sames  Surg Asst-Relief: Jose A Soda  Float Staff: Kirby Weaver  Event Time In   Incision Start 2882   Incision Close 1513     Anesthesia: General   Estimated Blood Loss: 200  Specimens: * No specimens in log *   Findings: none   Complications: none  Implants:   Implant Name Type Inv.  Item Serial No.  Lot No. LRB No. Used Action   GRAFT CLLGN SCAFFOLD [id number] -- 2/PK [employer] - [id number]  GRAFT CLLGN SCAFFOLD 15C10PZ -- 2/PK [employer] [id number] N/A 1 Implanted   GRAFT BNE PUTTY BIOACTV 7.5GM -- SIGNAFUSE - [id number]  GRAFT BNE PUTTY BIOACTV 7.5GM -- [address] [id number] N/A 1 Implanted   GRAFT BNE BIOACTV INTERFC 1GM -- INTERFACE - [id number]  GRAFT BNE BIOACTV INTERFC 1GM -- INTERFACE  [employer] [id number] N/A 1 Implanted   SPACER LORDTC SI 17X14-7MM -- COROENT - [id number]  SPACER LORDTC SI 17X14-7MM -- COROENT  NUVASIVE 1111 N/A 4 Implanted   SCR BNE SPNE 4.0X14MM --  - [id number]  SCR BNE SPNE 4.0X14MM --   NUVASIVE 1111 N/A 9 Implanted   SCREWS 4.0X13MM     1111 N/A 1 Implanted   SCR SET SPNE HOOK AND [place]IP -- VUEPOINT II - [id number]  SCR SET SPNE HOOK AND TULIP -- VUEPOINT II  NUVASIVE 1111 N/A 10 Implanted   SCR BNE SPNE CAREY-ANGL 3.5X12MM -- VUEPOINT II - [id number]  SCR BNE SPNE CAREY-ANGL 3.5X12MM -- VUEPOINT II  NUVASIVE 1111 N/A 3 Implanted   SCR SPNE BNE MULTAXL 3.5X14MM -- VUEPOINT II - [id number]  SCR SPNE BNE MULTAXL 3.5X14MM -- VUEPOINT II  NUVASIVE 1111 N/A 7 Implanted   HOLDEN SPNE PRE-CONTR 80MM -- VUEPOINT II - [id number]   HOLDEN SPNE PRE-CONTR 80MM -- VUEPOINT II   NUVASIVE 1111 N/A 2 Implanted   medium hemovac drain , neuro-monitioring

## 2018-05-09 NOTE — OP NOTES
[address] REPORT    Iveth Aguilera  MR#: [id number]  : 1955  ACCOUNT #: [de-identified]   DATE OF SERVICE: 2018    PREOPERATIVE DIAGNOSES:  Cervical stenosis with myelopathy, Sandwich neck deformity. POSTOPERATIVE DIAGNOSES:  Cervical stenosis with myelopathy, Sandwich neck deformity. PROCEDURES PERFORMED:   1. Anterior cervical diskectomies (C6-7, C5-6, C4-5, C3-4). 2.  Arthrodesis of cervical spine (C6-7, C5-6, C4-5, C3-4). 3.  Placement of structural PEEK graft into cervical spine (C3 to C7). Biologic Signafuse putty. Hardware was NuVasive cervical interlock. SURGEON:  MD Laura Araujo. ANESTHESIA:  General.    ESTIMATED BLOOD LOSS:  150 mL     FLUID REPLACEMENT:  1200 mL    URINE OUTPUT:  462 mL    COMPLICATIONS:  None. DRAINS:  Medium Hemovac. SPECIMENS REMOVED:  None. IMPLANTS/HARDWARE:  Please refer to the record. INDICATIONS:  The patient is a very nice 70-year-old female who presents with a history of neck pain, paresthesias in her hand and myelopathic features on her examination. She brought with her an MRI scan that showed a fairly significant swan neck deformity through the mid portion of her cervical spine. Motion x-ray showed instability at C3-C4. The patient was osteopenic. She also was an avid smoker. Different treatment options were discussed and it was felt that an anterior procedure would help correct her swan neck deformity and decompress her cord, and a posterior procedure would help stabilize her spine. DESCRIPTION OF PROCEDURE:  The patient was admitted to the hospital and prepared and brought to the operating where she was anesthetized and intubated. The patient had the monitoring electrodes for electrophysiologic monitoring placed prior to beginning the procedure. The technicians obtained a stable baseline and monitored her throughout the entire case.   I had planned a left-sided carotid type of an incision. The patient was given antibiotics and Decadron at induction. Time out was done and we planned to proceed. PROCEDURE #1:  Anterior cervical diskectomies (C3-4, C4-5, C5-6, C6-7). With a prepped field, once timeout was done, the patient had an incision made over the anterior border of the sternocleidomastoid. The C-arm was utilized to help localize this incision, it was made with a 10 blade. The platysma was divided in the direction of the skin incision. Dissection took place along the anterior border of the sternocleidomastoid until I was able to palpate the carotid and then I was able to enter the prevertebral space with blunt dissection. On mobilizing the esophagus, there was a fairly prominent superior thyroid artery crossing the surgical field. This was doubly ligated and coagulated and divided without difficulty. I was able to open the prevertebral fascia and we had exposed from C3 down to C7 with minimal retraction on the trachea and the esophagus. We planned to work from a caudal to cranial extent, and we mobilized the longus colli muscle at each level to prevent excessive blood loss. We started at the C6-7 level. The tooth retractor blades from the Shadow-Line system were placed. The distraction pins measuring 12 mm were placed with fluoroscopic guidance into the vertebral body of C6 and C7, and distraction was applied. The disk space was then entered with a 2 mm Kerrison punch, the anterior spondylitic ridge was resected and diskectomy was performed. Gentle retraction was placed on the distraction pins as we deepened the disk. We got to the back of the disk space and with a micro curette and hooks, we were able to open the PLL. I was able to identify the dura and I was able to resect bone spurs from the inferior C6 and superior C7. After completing the decompression, we did the fusion, which will be described below.   A similar procedure was done, having completed the one at the C6-7 level, attention was then turned to the C5-6 level. The longus colli muscles were developed and the retractors moved craniad. Distraction pins were placed, distraction applied and the disk space was opened. In a similar fashion, we went through and opened the disk, removed the PLL and decompressed the spinal column from uncovertebral junction and uncovertebral junction. Once this was completed, we again mobilized the longus colli muscle cranially. We moved the retractor craniad and completed this dissection at the C4-5 level. At the completion of this, we then moved to the C3-4 level and completed the diskectomies and decompression of the dura. At each level, I was able to open the PLL, identify the dura from uncovertebral junction to uncovertebral junction, and the neural foramens were widely patent. PROCEDURE #2:  Arthrodesis of cervical spine (C3-4, C4-5, C5-6, C6-7). At each level, having completed the decompression, I trialed different sizes for a PEEK plug. At all levels, I was able to place a 7 x 17 x 14 graft with 7 degrees of lordosis. The implants were secured utilizing titanium screws, 1 directed upward and 2 directed down. At all levels except the C4, I put 4.0 x 14 mm screws. I put two 12 mm screws pointed down into the C4 vertebral body. Having completed each of the dissections, we irrigated with antibiotic-containing saline. The self-retaining retractor was removed. X-rays were taken to confirm our position. The longus colli muscles were vigorously coagulated, any bleeding points were controlled easily. I did place a medium Hemovac drain and brought it out through an inferior stab incision. This wound was then closed, reapproximating the interval between the sternocleidomastoid and the strap muscles with 0 Vicryl. The platysma was closed with 3-0 Vicryl.   The subcu was closed with 3-0 Vicryl and the skin was closed with running subcuticular Stratafix type stitch and a Dermabond dressing. A drain stitch was placed around the drain and secured, and dressings were applied. The estimated blood loss was 150 mL. There were no complications and the condition at the end of the surgery, the patient was doing quite well.       MD PRATIK Lagunas / EMMANUEL  D: 05/09/2018 16:09     T: 05/09/2018 16:42  JOB #: [id number]

## 2018-05-09 NOTE — INTERVAL H&P NOTE
H&P Update:  Mindy Metcalf was seen and examined. History and physical has been reviewed. The patient has been examined.  There have been no significant clinical changes since the completion of the originally dated History and Physical.    Signed By: Angel Lambert MD     May 9, 2018 7:30 AM

## 2018-05-09 NOTE — IP AVS SNAPSHOT
[address] [facility] [address] 
 
 
 [address] Patient: Angel Velasco MRN: [id number] About your hospitalization You were admitted on:  May 9, 2018 You last received care in the:  [address] [place] [address],[address] You were discharged on:  May 13, 2018 Why you were hospitalized Your primary diagnosis was:  Cervical Myelopathy (Hcc) Follow-up Information Follow up With Details Comments Contact Info Darryn Angelo MD   [address] [address] 
[phone] Discharge Orders None A check joseline indicates which time of day the medication should be taken. My Medications CONTINUE taking these medications Instructions Each Dose to Equal  
 Morning Noon Evening Bedtime LEXAPRO 20 mg tablet Generic drug:  escitalopram oxalate Your last dose was: Your next dose is: Take 20 mg by mouth daily. 20 mg  
    
   
   
   
  
 omeprazole 40 mg capsule Commonly known as:  PRILOSEC Your last dose was: Your next dose is: Take 40 mg by mouth daily. 40 mg Discharge Instructions [facility], [facility] 12 [facility], [address] [facility], [address] [place] [address] Road Phone:     [phone]  Fax:         [phone] MD Vidya Whitfield MD Nestor House, MD Chaya Raring. MD Nadya Cardona MD Chong Mulder, MD Priscilla Dixon PAAzaelC Discharge Instructions With your recent surgery it is not unusual to experience some pain or discomfort in the  area of previous pain or the incision. Accordingly, you have been given pain medication for your comfort until the healing process is further along. As time progresses, you should notice the frequency and the intensity of your discomfort steadily decrease.   Here are some simple post operative instructions to help during your home convalescence. 1. Use your pain medication as directed. Refills should be requested during regular office hours and not on weekends. 2. Continue any regular medicine for other conditions (e.g. blood pressure, diabetes, heart problems, etc.). 3. No driving or riding in a car for four (4) weeks except for emergencies or doctors appointments. 4. No bending, lifting greater than 5 lbs, or strenuous activities. If you need to get to the floor, squat instead of bend. 5. No tub baths. Showers are fine after surgery, but make sure to towel dry the incision. A chair may be used in the shower so that you can sit if needed. 6. Avoid exercises except for walking. Begin with frequent, but short, periods of walking and gradually build up to longer periods of time. 7. Sit for short periods of time (10-15 minutes) in the first week after surgery. 8. You may resume sexual relations as comfort level allows. 9. If a cervical collar has been prescribed, wear the cervical collar at all times except when showering. 10. If a lumbar brace has been prescribed wear it when up walking greater than 15 minutes. No need to wear while sitting. 11. Notify us if you develop fever, painful redness around the incision or drainage from the wound. 12. Call and schedule you follow-up appointment with your doctor @ [phone]. If you have any other questions, please contact our office at [phone]. Thank you! LeanApps Announcement We are excited to announce that we are making your provider's discharge notes available to you in LeanApps. You will see these notes when they are completed and signed by the physician that discharged you from your recent hospital stay.   If you have any questions or concerns about any information you see in LeanApps, please call the SmartNews Department where you were seen or reach out to your Primary Care Provider for more information about your plan of care. Introducing [facility]! Willima Thomas introduces OurHouse patient portal. Now you can access parts of your medical record, email your doctor's office, and request medication refills online. 1. In your internet browser, go to [email or web address] 2. Click on the First Time User? Click Here link in the Sign In box. You will see the New Member Sign Up page. 3. Enter your OurHouse Access Code exactly as it appears below. You will not need to use this code after youve completed the sign-up process. If you do not sign up before the expiration date, you must request a new code. · OurHouse Access Code: [email or web address] Expires: 8/6/2018  5:40 PM 
 
4. Enter the last four digits of your Social Security Number (xxxx) and Date of Birth (mm/dd/yyyy) as indicated and click Submit. You will be taken to the next sign-up page. 5. Create a OurHouse ID. This will be your OurHouse login ID and cannot be changed, so think of one that is secure and easy to remember. 6. Create a OurHouse password. You can change your password at any time. 7. Enter your Password Reset Question and Answer. This can be used at a later time if you forget your password. 8. Enter your e-mail address. You will receive e-mail notification when new information is available in [address] 9. Click Sign Up. You can now view and download portions of your medical record. 10. Click the Download Summary menu link to download a portable copy of your medical information. If you have questions, please visit the Frequently Asked Questions section of the OurHouse website. Remember, OurHouse is NOT to be used for urgent needs. For medical emergencies, dial 911. Now available from your iPhone and Android! Introducing Ryan Camarillo As a Donovan Pollack patient, I wanted to make you aware of our electronic visit tool called Ryan Jessenavidanel. [facility] 24/7 allows you to connect within minutes with a medical provider 24 hours a day, seven days a week via a mobile device or tablet or logging into a secure website from your computer. You can access Ryan Jessenavidfin from anywhere in the [place]. A virtual visit might be right for you when you have a simple condition and feel like you just dont want to get out of bed, or cant get away from work for an appointment, when your regular [facility] provider is not available (evenings, weekends or holidays), or when youre out of town and need minor care. Electronic visits cost only $49 and if the [facility] 24/7 provider determines a prescription is needed to treat your condition, one can be electronically transmitted to a nearby pharmacy*. Please take a moment to enroll today if you have not already done so. The enrollment process is free and takes just a few minutes. To enroll, please download the [employer] 24/7 davide to your tablet or phone, or visit [email or web address] to enroll on your computer. And, as an [address] [place] [address] patient with a [employer] account, the results of your visits will be scanned into your electronic medical record and your primary care provider will be able to view the scanned results. We urge you to continue to see your regular [facility] provider for your ongoing medical care. And while your primary care provider may not be the one available when you seek a Ryan Jessemamadou virtual visit, the peace of mind you get from getting a real diagnosis real time can be priceless. For more information on Ryan Jessemamadou, view our Frequently Asked Questions (FAQs) at [email or web address]. Sincerely, 
 
Sakina Schrader MD 
Chief Medical Officer [place] Lisa Adamson *:  certain medications cannot be prescribed via Ryan Camarillo Unresulted Labs-Please follow up with your PCP about these lab tests Order Current Status NC XR TECHNOLOGIST SERVICE In process XR SPINE CERV PA LAT ODONT 3 V MAX In process Providers Seen During Your Hospitalization Provider Specialty Primary office phone Sheridan New MD Neurosurgery [phone] Your Primary Care Physician (PCP) Primary Care Physician Office Phone Office Fax Frank Davies [phone] You are allergic to the following No active allergies Recent Documentation Height Weight Breastfeeding? BMI OB Status Smoking Status 1.651 m 70.7 kg No 25.94 kg/m2 Hysterectomy Light Tobacco Smoker Emergency Contacts Name Discharge Info Relation Home Work Mobile Weathers,Terrancce DISCHARGE CAREGIVER [3] Boyfriend [17] [phone] Patient Belongings The following personal items are in your possession at time of discharge: 
  Dental Appliances: Lowers, Uppers  Visual Aid: None      Home Medications: None   Jewelry: None  Clothing: Shirt, Undergarments, Jacket/Coat, Pants, Footwear, Socks    Other Valuables: None Discharge Instructions Attachments/References CERVICAL SPINAL FUSION: POST-OP (ENGLISH) Patient Handouts Cervical Spinal Fusion: What to Expect at [facility] Your Recovery After surgery, you can expect your neck to feel stiff and sore. This should improve in the weeks after surgery. You may have trouble sitting or standing in one position for very long and may need pain medicine in the weeks after your surgery. You may need to wear a neck brace for a while. It may take 4 to 6 weeks to get back to your usual activities, but it may depend on what kind of surgery you had. Your doctor may advise you to work with a physical therapist to strengthen the muscles around your neck and back. This care sheet gives you a general idea about how long it will take for you to recover. But each person recovers at a different pace. Follow the steps below to get better as quickly as possible. How can you care for yourself at home? Activity ? · Rest when you feel tired. Getting enough sleep will help you recover. ? · Try to walk each day. Start by walking a little more than you did the day before. Bit by bit, increase the amount you walk. Walking boosts blood flow and helps prevent pneumonia and constipation. Walking may also decrease your muscle soreness after surgery. ? · Follow your doctor's directions about not lifting anything that would strain your neck and back. This may include a child, heavy grocery bags and milk containers, a heavy briefcase or backpack, cat litter or dog food bags, or a vacuum . ? · Avoid strenuous activities, such as bicycle riding, jogging, weightlifting, or aerobic exercise, until your doctor says it is okay. ? · Do not drive for 2 to 4 weeks after your surgery or until your doctor says it is okay. ? · Avoid taking long car trips for 2 to 4 weeks after surgery. Your neck may become tired and painful from sitting too long in one position. ? · You will probably need to take 4 to 6 weeks off from work. It depends on the type of work you do and how you feel. ? · You may have sex as soon as you feel able, but avoid positions that put stress on your neck or cause pain. Diet ? · You can eat your normal diet. If your stomach is upset, try bland, low-fat foods like plain rice, broiled chicken, toast, and yogurt. ? · Drink plenty of fluids. If you have kidney, heart, or liver disease and have to limit fluids, talk with your doctor before you increase the amount of fluids you drink. ? · You may notice that your bowel movements are not regular right after your surgery. This is common.  Try to avoid constipation and straining with bowel movements. You may want to take a fiber supplement every day. If you have not had a bowel movement after a couple of days, ask your doctor about taking a mild laxative. Medicines ? · Your doctor will tell you if and when you can restart your medicines. He or she will also give you instructions about taking any new medicines. ? · If you take blood thinners, such as warfarin (Coumadin), clopidogrel (Plavix), or aspirin, be sure to talk to your doctor. He or she will tell you if and when to start taking those medicines again. Make sure that you understand exactly what your doctor wants you to do. ? · Be safe with medicines. Take pain medicines exactly as directed. ¨ If the doctor gave you a prescription medicine for pain, take it as prescribed. ¨ If you are not taking a prescription pain medicine, ask your doctor if you can take an over-the-counter medicine. ? · If your doctor prescribed antibiotics, take them as directed. Do not stop taking them just because you feel better. You need to take the full course of antibiotics. ? · If you think your pain pill is making you sick to your stomach: 
¨ Take your pills after meals (unless your doctor has told you not to). ¨ Ask your doctor for a different pain pill. Incision care ? · You will be given specific instructions about how to care for the cut (incision) the doctor made. The instructions will depend on the type of materials used to close the cut. Exercise ? · Do exercises as instructed by your doctor or physical therapist to improve your strength and flexibility. Other instructions ? · To reduce stiffness and help sore muscles, use a warm water bottle, a heating pad set on low, or a warm cloth on your neck. Do not put heat right over the incision. Do not go to sleep with a heating pad on your skin. Follow-up care is a key part of your treatment and safety.  Be sure to make and go to all appointments, and call your doctor if you are having problems. It's also a good idea to know your test results and keep a list of the medicines you take. When should you call for help? Call 911 anytime you think you may need emergency care. For example, call if: 
? · You passed out (lost consciousness). ? · You have chest pain, are short of breath, or cough up blood. ? · You are unable to move an arm or a leg at all. ?Call your doctor now or seek immediate medical care if: 
? · You have pain that does not get better after you take pain medicine. ? · You have loose stitches, or your incision comes open. ? · Bright red blood has soaked through the bandage over your incision. ? · You have signs of a blood clot in your leg (called a deep vein thrombosis), such as: 
¨ Pain in your calf, back of the knee, thigh, or groin. ¨ Redness or swelling in your leg. ? · You have signs of infection, such as: 
¨ Increased pain, swelling, warmth, or redness. ¨ Red streaks leading from the incision. ¨ Pus draining from the incision. ¨ A fever. ? · You have new or worse symptoms in your arms, legs, chest, belly, or buttocks. Symptoms may include: ¨ Numbness or tingling. ¨ Weakness. ¨ Pain. ? · You lose bladder or bowel control. ? Watch closely for any changes in your health, and be sure to contact your doctor if: 
? · You do not get better as expected. Where can you learn more? Go to http://fabien-ashanti.info/. Enter U098 in the search box to learn more about \"Cervical Spinal Fusion: What to Expect at Home. \" Current as of: March 21, 2017 Content Version: 11.4 © 5018-8739 [employer]. Care instructions adapted under license by [employer] (which disclaims liability or warranty for this information). If you have questions about a medical condition or this instruction, always ask your healthcare professional. [address] any warranty or liability for your use of this information. Please provide this summary of care documentation to your next provider. Signatures-by signing, you are acknowledging that this After Visit Summary has been reviewed with you and you have received a copy. Patient Signature:  ____________________________________________________________ Date:  ____________________________________________________________  
  
Arna Bushnell Provider Signature:  ____________________________________________________________ Date:  ____________________________________________________________

## 2018-05-10 LAB
ANION GAP SERPL CALC-SCNC: 3 MMOL/L (ref 3–18)
BASOPHILS # BLD: 0 K/UL (ref 0–0.06)
BASOPHILS NFR BLD: 0 % (ref 0–2)
BUN SERPL-MCNC: 13 MG/DL (ref 7–18)
BUN/CREAT SERPL: 18 (ref 12–20)
CALCIUM SERPL-MCNC: 8.2 MG/DL (ref 8.5–10.1)
CHLORIDE SERPL-SCNC: 100 MMOL/L (ref 100–108)
CO2 SERPL-SCNC: 32 MMOL/L (ref 21–32)
CREAT SERPL-MCNC: 0.71 MG/DL (ref 0.6–1.3)
DIFFERENTIAL METHOD BLD: ABNORMAL
EOSINOPHIL # BLD: 0 K/UL (ref 0–0.4)
EOSINOPHIL NFR BLD: 0 % (ref 0–5)
ERYTHROCYTE [DISTWIDTH] IN BLOOD BY AUTOMATED COUNT: 13.4 % (ref 11.6–14.5)
GLUCOSE SERPL-MCNC: 134 MG/DL (ref 74–99)
HCT VFR BLD AUTO: 37.4 % (ref 35–45)
HGB BLD-MCNC: 12.6 G/DL (ref 12–16)
LYMPHOCYTES # BLD: 0.6 K/UL (ref 0.9–3.6)
LYMPHOCYTES NFR BLD: 7 % (ref 21–52)
MCH RBC QN AUTO: 31.7 PG (ref 24–34)
MCHC RBC AUTO-ENTMCNC: 33.7 G/DL (ref 31–37)
MCV RBC AUTO: 94.2 FL (ref 74–97)
MONOCYTES # BLD: 0.4 K/UL (ref 0.05–1.2)
MONOCYTES NFR BLD: 5 % (ref 3–10)
NEUTS SEG # BLD: 7.6 K/UL (ref 1.8–8)
NEUTS SEG NFR BLD: 88 % (ref 40–73)
PLATELET # BLD AUTO: 101 K/UL (ref 135–420)
PMV BLD AUTO: 12.1 FL (ref 9.2–11.8)
POTASSIUM SERPL-SCNC: 4.7 MMOL/L (ref 3.5–5.5)
RBC # BLD AUTO: 3.97 M/UL (ref 4.2–5.3)
SODIUM SERPL-SCNC: 135 MMOL/L (ref 136–145)
WBC # BLD AUTO: 8.6 K/UL (ref 4.6–13.2)

## 2018-05-10 PROCEDURE — 65660000001 HC RM ICU INTERMED STEPDOWN

## 2018-05-10 PROCEDURE — 85025 COMPLETE CBC W/AUTO DIFF WBC: CPT | Performed by: NEUROLOGICAL SURGERY

## 2018-05-10 PROCEDURE — 36415 COLL VENOUS BLD VENIPUNCTURE: CPT | Performed by: NEUROLOGICAL SURGERY

## 2018-05-10 PROCEDURE — 97530 THERAPEUTIC ACTIVITIES: CPT

## 2018-05-10 PROCEDURE — 74011250636 HC RX REV CODE- 250/636: Performed by: NEUROLOGICAL SURGERY

## 2018-05-10 PROCEDURE — C9113 INJ PANTOPRAZOLE SODIUM, VIA: HCPCS | Performed by: NEUROLOGICAL SURGERY

## 2018-05-10 PROCEDURE — 74011000250 HC RX REV CODE- 250: Performed by: NEUROLOGICAL SURGERY

## 2018-05-10 PROCEDURE — 97162 PT EVAL MOD COMPLEX 30 MIN: CPT

## 2018-05-10 PROCEDURE — 80048 BASIC METABOLIC PNL TOTAL CA: CPT | Performed by: NEUROLOGICAL SURGERY

## 2018-05-10 PROCEDURE — 74011250637 HC RX REV CODE- 250/637: Performed by: NEUROLOGICAL SURGERY

## 2018-05-10 PROCEDURE — 97116 GAIT TRAINING THERAPY: CPT

## 2018-05-10 PROCEDURE — 77010033678 HC OXYGEN DAILY

## 2018-05-10 RX ADMIN — OXYCODONE HYDROCHLORIDE AND ACETAMINOPHEN 2 TABLET: 5; 325 TABLET ORAL at 09:02

## 2018-05-10 RX ADMIN — CEFAZOLIN SODIUM 2 G: 2 SOLUTION INTRAVENOUS at 06:51

## 2018-05-10 RX ADMIN — CYCLOBENZAPRINE HYDROCHLORIDE 10 MG: 10 TABLET, FILM COATED ORAL at 19:45

## 2018-05-10 RX ADMIN — OXYCODONE HYDROCHLORIDE AND ACETAMINOPHEN 2 TABLET: 5; 325 TABLET ORAL at 19:45

## 2018-05-10 RX ADMIN — Medication 10 ML: at 15:30

## 2018-05-10 RX ADMIN — HYDROMORPHONE HYDROCHLORIDE 1 MG: 1 INJECTION, SOLUTION INTRAMUSCULAR; INTRAVENOUS; SUBCUTANEOUS at 21:45

## 2018-05-10 RX ADMIN — ESCITALOPRAM OXALATE 20 MG: 10 TABLET ORAL at 08:03

## 2018-05-10 RX ADMIN — DEXAMETHASONE SODIUM PHOSPHATE 4 MG: 4 INJECTION, SOLUTION INTRAMUSCULAR; INTRAVENOUS at 15:29

## 2018-05-10 RX ADMIN — CYCLOBENZAPRINE HYDROCHLORIDE 10 MG: 10 TABLET, FILM COATED ORAL at 08:03

## 2018-05-10 RX ADMIN — OXYCODONE HYDROCHLORIDE AND ACETAMINOPHEN 2 TABLET: 5; 325 TABLET ORAL at 15:29

## 2018-05-10 RX ADMIN — Medication 10 ML: at 21:45

## 2018-05-10 RX ADMIN — HYDROMORPHONE HYDROCHLORIDE 1 MG: 1 INJECTION, SOLUTION INTRAMUSCULAR; INTRAVENOUS; SUBCUTANEOUS at 06:36

## 2018-05-10 RX ADMIN — SODIUM CHLORIDE 40 MG: 9 INJECTION INTRAMUSCULAR; INTRAVENOUS; SUBCUTANEOUS at 17:40

## 2018-05-10 RX ADMIN — DEXAMETHASONE SODIUM PHOSPHATE 4 MG: 4 INJECTION, SOLUTION INTRAMUSCULAR; INTRAVENOUS at 06:51

## 2018-05-10 RX ADMIN — OXYCODONE HYDROCHLORIDE AND ACETAMINOPHEN 2 TABLET: 5; 325 TABLET ORAL at 23:45

## 2018-05-10 RX ADMIN — HYDROMORPHONE HYDROCHLORIDE 1 MG: 1 INJECTION, SOLUTION INTRAMUSCULAR; INTRAVENOUS; SUBCUTANEOUS at 12:25

## 2018-05-10 RX ADMIN — HYDROMORPHONE HYDROCHLORIDE 1 MG: 1 INJECTION, SOLUTION INTRAMUSCULAR; INTRAVENOUS; SUBCUTANEOUS at 02:14

## 2018-05-10 RX ADMIN — Medication 10 ML: at 06:54

## 2018-05-10 RX ADMIN — OXYCODONE HYDROCHLORIDE AND ACETAMINOPHEN 2 TABLET: 5; 325 TABLET ORAL at 05:00

## 2018-05-10 NOTE — PROGRESS NOTES
12- Assumed care of pt. Report received by Pieter Britton. Dual neuro completed. 0800- Assessment complete. A/ox4, KIRK, FC, +sensation  Lungs coarse on 2 L NC  SR on the monitor, pulses palpable, SCDs to BLE  Ab soft, nontender, alicia draining clear yellow urine  Posterior and anterior Hemovac drains with sanguineous drainage. Cervical collar in place  PRN flexeril given for muscular spasms    0845- Alicia cath removed, pt tolerated well. DTV 4756  Pt assisted by PT and sitting up in chair. 7649- Pt complaining of 9/10 pain to neck, PRN percocet given     1200- Pt ambulated with assistance to [facility] and voided 400 cc clear yellow urine    1225- Pt complaining of 9/10 pain to neck, PRN dilaudid given     1532- Pt complaining of 7/10 pain to neck, PRN percocet given     1920- Bedside and Verbal shift change report given to [address] [place] [address] (oncoming nurse) by Penny Galvez. Osiris Ibarra RN   (offgoing nurse). Report included the following information SBAR, Kardex, Procedure Summary, MAR, Recent Results and Cardiac Rhythm SR/SB.  Dual neuro completed

## 2018-05-10 NOTE — PROGRESS NOTES
[facility]  Hospitalist Division        Inpatient Daily Progress Note    Daily progress Note    Patient: Renan Treviño MRN: [id number]  CSN: [id number]    YOB: 1955  Age: 58 y.o. Sex: female    DOA: 5/9/2018 LOS:  LOS: 1 day                    Chief Complaint:  Cervical Myelopathy      Assessment/Plan:     Patient Active Problem List   Diagnosis Code    Gross hematuria R31.0    Nephrolithiasis N20.0    Ureteral stone N20.1    Gastroesophageal reflux disease K21.9    Chronic viral hepatitis C ([facility] [address] B18.2    Hepatitis C virus infection B19.20    Morbid obesity ([facility] [address] E66.01    Thrombocytopenia ([facility] [address] D69.6    Tobacco dependence syndrome F17.200    Calculus of ureter N20.1    Abdominal pain R10.9    Incisional hernia with obstruction but no gangrene K43.0    Cervical myelopathy (HCC) G95.9       A/P:  Cervical Myelopathy  -  s/p prone/posterior C3-C7 instrumentation, posterior fusion C3-4, C4-5, C5-6 C6-7  - supine/ ANTERIOR c3-4,c4-5,c5-6,c6-7 DISCECTOMY and FUSION with peek arthrodesis of cervical spine, c34, c45, c56 and c67    - Ancef 2 grams TID x 3 doses  - Neuro checks  - Decadron 4 mg TID  - pain control, Flexeril  - pulmonary toilet, encourage ICS  - brace, hemovac drain care TID  - ambulate w/ assist, with brace     Hx of HCV w/ cirrhosis  - pt states she was treated in recent past  - f/u with PCP/GI outpatient     Hx of GERD  - continue Prilosec 40 mg daily      Tobacco abuse  - encourage cessation  - Nicotine patch     Hx of depression  - continue Lexapro     DVT Prophylaxis  - SCD's BLE      JOSE Wong  [facility]  Hospitalist Division  Pager:  [phone]  Office:  [phone]        Subjective:      Pain controlled with meds. OOB to chair today. Numbness/tingling in upper extremities improving.     Objective:      Visit Vitals    /61    Pulse (!) 59    Temp 98.2 °F (36.8 °C)    Resp 10    Ht 5' 5\" (1.651 m)    Wt 73.1 kg (161 lb 3 oz)    SpO2 98%    Breastfeeding No    BMI 26.82 kg/m2           Physical Exam:  General appearance: alert, cooperative, no distress, appears stated age  Head: Neck brace in place, left anterior hemovac posterior hemovac intact  Lungs: clear to auscultation bilaterally  Heart: regular rate and rhythm, S1, S2 normal, no murmur, click, rub or gallop  Abdomen: soft, non tender, non distended. Normoactive bowel sounds. Extremities: extremities normal, atraumatic, no cyanosis or edema  Skin: Scattered bruises BUE  Neurologic: Grossly normal, sensation intact  PSY: Mood and affect normal, appropriately behaved        Intake and Output:  Current Shift:     Last three shifts:  05/08 1901 - 05/10 0700  In: 3672.5 [I.V.:3672.5]  Out: 4991 [Urine:1575; Drains:270]    Recent Results (from the past 24 hour(s))   CBC WITH AUTOMATED DIFF    Collection Time: 05/10/18  4:20 AM   Result Value Ref Range    WBC 8.6 4.6 - 13.2 K/uL    RBC 3.97 (L) 4.20 - 5.30 M/uL    HGB 12.6 12.0 - 16.0 g/dL    HCT 37.4 35.0 - 45.0 %    MCV 94.2 74.0 - 97.0 FL    MCH 31.7 24.0 - 34.0 PG    MCHC 33.7 31.0 - 37.0 g/dL    RDW 13.4 11.6 - 14.5 %    PLATELET 248 (L) 280 - 420 K/uL    MPV 12.1 (H) 9.2 - 11.8 FL    NEUTROPHILS 88 (H) 40 - 73 %    LYMPHOCYTES 7 (L) 21 - 52 %    MONOCYTES 5 3 - 10 %    EOSINOPHILS 0 0 - 5 %    BASOPHILS 0 0 - 2 %    ABS. NEUTROPHILS 7.6 1.8 - 8.0 K/UL    ABS. LYMPHOCYTES 0.6 (L) 0.9 - 3.6 K/UL    ABS. MONOCYTES 0.4 0.05 - 1.2 K/UL    ABS. EOSINOPHILS 0.0 0.0 - 0.4 K/UL    ABS.  BASOPHILS 0.0 0.0 - 0.06 K/UL    DF AUTOMATED     METABOLIC PANEL, BASIC    Collection Time: 05/10/18  4:20 AM   Result Value Ref Range    Sodium 135 (L) 136 - 145 mmol/L    Potassium 4.7 3.5 - 5.5 mmol/L    Chloride 100 100 - 108 mmol/L    CO2 32 21 - 32 mmol/L    Anion gap 3 3.0 - 18 mmol/L    Glucose 134 (H) 74 - 99 mg/dL    BUN 13 7.0 - 18 MG/DL    Creatinine 0.71 0.6 - 1.3 MG/DL    BUN/Creatinine ratio 18 12 - 20      GFR est AA >60 >60 ml/min/1.73m2    GFR est non-AA >60 >60 ml/min/1.73m2    Calcium 8.2 (L) 8.5 - 10.1 MG/DL           Lab Results   Component Value Date/Time    Glucose 134 (H) 05/10/2018 04:20 AM        Imaging:  No results found.     Medication List Reviewed:  Current Facility-Administered Medications   Medication Dose Route Frequency    scopolamine (TRANSDERM-SCOP) 1 mg over 3 days 1 Patch  1 Patch TransDERmal ONCE    escitalopram oxalate (LEXAPRO) tablet 20 mg  20 mg Oral DAILY    sodium chloride (NS) flush 5-10 mL  5-10 mL IntraVENous Q8H    sodium chloride (NS) flush 5-10 mL  5-10 mL IntraVENous PRN    acetaminophen (TYLENOL) tablet 650 mg  650 mg Oral Q4H PRN    oxyCODONE-acetaminophen (PERCOCET) 5-325 mg per tablet 1-2 Tab  1-2 Tab Oral Q4H PRN    HYDROmorphone (DILAUDID) syringe 1 mg  1 mg IntraVENous Q4H PRN    naloxone (NARCAN) injection 0.4 mg  0.4 mg IntraVENous PRN    ondansetron (ZOFRAN) injection 4 mg  4 mg IntraVENous Q4H PRN    phenol throat spray (CHLORASEPTIC) 1 Spray  1 Spray Oral PRN    pantoprazole (PROTONIX) 40 mg in sodium chloride 0.9% 10 mL injection  40 mg IntraVENous Q24H    cyclobenzaprine (FLEXERIL) tablet 10 mg  10 mg Oral TID PRN    dexamethasone (DECADRON) 4 mg/mL injection 4 mg  4 mg IntraVENous Q8H    nicotine (NICODERM CQ) 21 mg/24 hr patch 1 Patch  1 Patch TransDERmal Q24H

## 2018-05-10 NOTE — DIABETES MGMT
NUTRITIONAL ASSESSMENT AND GLYCEMIC CONTROL PLAN OF CARE     Mindy Metcalf           58 y.o.           5/9/2018              Patient Active Problem List   Diagnosis Code    Gross hematuria R31.0    Nephrolithiasis N20.0    Ureteral stone N20.1    Gastroesophageal reflux disease K21.9    Chronic viral hepatitis C ([facility] [address] B18.2    Hepatitis C virus infection B19.20    Morbid obesity ([facility] [address] E66.01    Thrombocytopenia ([facility] [address] D69.6    Tobacco dependence syndrome F17.200    Calculus of ureter N20.1    Abdominal pain R10.9    Incisional hernia with obstruction but no gangrene K43.0    Cervical myelopathy (HCC) G95.9        [x]  No Cultural, Buddhist or ethnic dietary need identified. []  Cultural, Buddhist and ethnic food preferences identified and addressed    [x]  Participated in discharge planning/Interdisciplinary rounds   Food allergies: [x]  No        []  Yes-  ASSESSMENT:   Pt is overweight related to excess caloric intake as evidenced by 129% ideal weight and BMI=26.8kg/m2. Pt with intolerance problems r/t sore throat, requesting more liquids on tray. Pt with hx of wt fluctuations, weighed 219lbs at one time. INTERVENTIONS/PLAN:     Pt would like Vanilla Ensure- added tid. SUBJECTIVE/OBJECTIVE:   Information obtained from: Pt    Diet: regular  No data found.     Medications: [x]                Reviewed     Most Recent POC Glucose:   Recent Labs      05/10/18   0420   GLU  134*         Labs:   No results found for: HBA1C, HGBE8, YGJ7TNVW  Lab Results   Component Value Date/Time    Sodium 135 (L) 05/10/2018 04:20 AM    Potassium 4.7 05/10/2018 04:20 AM    Chloride 100 05/10/2018 04:20 AM    CO2 32 05/10/2018 04:20 AM    Anion gap 3 05/10/2018 04:20 AM    Glucose 134 (H) 05/10/2018 04:20 AM    BUN 13 05/10/2018 04:20 AM    Creatinine 0.71 05/10/2018 04:20 AM    Calcium 8.2 (L) 05/10/2018 04:20 AM       Anthropometrics: IBW : 56.7 kg (125 lb), % IBW (Calculated): 128.95 %, BMI (calculated): 26.8  Wt Readings from Last 1 Encounters:   05/09/18 73.1 kg (161 lb 3 oz)      Ht Readings from Last 1 Encounters:   05/09/18 5' 5\" (1.651 m)       Estimated Nutrition Needs: 1750 Kcals/day, Protein (g): 75 g Fluid (ml): 1800 ml  Based on:   [x]          Actual BW    []          IBW   []            Adjusted BW      Nutrition Diagnoses: as stated above       Nutrition Interventions: Encouraged increased intake at meals to meet calorie and protein requirements. Goal:     Wt maintenance by 5/20. Pt will select nutrient dense food choices. Intake will meet >75% of energy and protein requirements by 5/15/18 .     Nutrition Monitoring and Evaluation      []     Monitor po intake on meal rounds  [x]     Continue inpatient monitoring and intervention  []     Other:      Nutrition Risk:  []   High     []  Moderate    [x]  Minimal/Uncompromised    Génesis Rae RD  [facility] [phone]

## 2018-05-10 NOTE — PROGRESS NOTES
Problem: Falls - Risk of  Goal: *Absence of Falls  Document Scooby Fall Risk and appropriate interventions in the flowsheet. Outcome: Progressing Towards Goal  Fall Risk Interventions:  Mobility Interventions: Bed/chair exit alarm, Patient to call before getting OOB, PT Consult for mobility concerns         Medication Interventions: Bed/chair exit alarm, Patient to call before getting OOB    Elimination Interventions: Bed/chair exit alarm, Call light in reach, Patient to call for help with toileting needs, Toileting schedule/hourly rounds             Problem: Pressure Injury - Risk of  Goal: *Prevention of pressure injury  Document Matthias Scale and appropriate interventions in the flowsheet. Outcome: Progressing Towards Goal  Pressure Injury Interventions: Activity Interventions: Increase time out of bed, Pressure redistribution bed/mattress(bed type)    Mobility Interventions: HOB 30 degrees or less, Pressure redistribution bed/mattress (bed type), Turn and reposition approx.  every two hours(pillow and wedges)    Nutrition Interventions: Document food/fluid/supplement intake

## 2018-05-10 NOTE — PROGRESS NOTES
Problem: Falls - Risk of  Goal: *Absence of Falls  Document Scooby Fall Risk and appropriate interventions in the flowsheet. Outcome: Progressing Towards Goal  Fall Risk Interventions:  Mobility Interventions: Patient to call before getting OOB, Strengthening exercises (ROM-active/passive), Bed/chair exit alarm    Mentation Interventions: Adequate sleep, hydration, pain control, Bed/chair exit alarm, More frequent rounding, Room close to nurse's station    Medication Interventions: Patient to call before getting OOB, Bed/chair exit alarm    Elimination Interventions: Call light in reach, Toileting schedule/hourly rounds             Problem: Pressure Injury - Risk of  Goal: *Prevention of pressure injury  Document Matthias Scale and appropriate interventions in the flowsheet. Outcome: Progressing Towards Goal  Pressure Injury Interventions:             Activity Interventions: Increase time out of bed, PT/OT evaluation, Pressure redistribution bed/mattress(bed type)    Mobility Interventions: Pressure redistribution bed/mattress (bed type), HOB 30 degrees or less    Nutrition Interventions: Document food/fluid/supplement intake    Friction and Shear Interventions: HOB 30 degrees or less

## 2018-05-10 NOTE — PROGRESS NOTES
Problem: Mobility Impaired (Adult and Pediatric)  Goal: *Acute Goals and Plan of Care (Insert Text)  Physical Therapy Goals  Initiated 5/10/2018 and to be accomplished within 7 day(s)  1. Patient will move from supine to sit and sit to supine  in bed with modified independence. 2.  Patient will perform sit to stand with modified independence. 3.  Patient will transfer from bed to chair and chair to bed with modified independence using the least restrictive device. 4.  Patient will ambulate with modified independence for 150 feet with the least restrictive device. 5.  Patient will ascend/descend 2 stairs with handrail(s) with modified independence. 6.  Patient will negotiate obstacles during ambulation with modified independence. 7.  Patient will verbalize cervical precautions precautions. 8.  Patient will demonstrate compliance with cervical precautions greater than 90% of session. 9.  Patient will demonstrate appropriate use of incentive spirometer to aid in pulomnary compliance for mobility. Outcome: Progressing Towards Goal    PHYSICAL THERAPY: Daily TREATMENT Note   INPATIENT: Commercial: Hospital Day: 2     Patient: Dana Beaulieu (32 y.o. female)    Date: 5/10/2018  Primary Diagnosis: cervical stenosis/instabiliey  Cervical myelopathy (HCC)   Procedure(s) (LRB):  supine/ ANTERIOR c3-4,c4-5,c5-6,c6-7 DISCECTOMY and FUSION with peek (N/A)  prone/posterior c3-c7 instrumentation (N/A), 1 Day Post-Op,   Precautions: Fall, Spinal, Skin  Chart, physical therapy assessment, plan of care and goals were reviewed. PLOF: Independent    ASSESSMENT:  SHIKHA Muñiz Or cleared patient for amb in [place] on room air. Supervision for supine to sit. Supervision for sit to stand. Amb 150ft with supervision and ww. Decreased gait speed. Educated on appropriate use of ww. Returned to seated in recliner with good eccentric control on stand to sit. Appropriate safety awareness throughout.  O2 saturation 87% on room air. Re-donned 2L O2 saturation returned to 93% in less than 30 seconds. BP post amb 120/64. RN Sheree Barton aware. Progression toward goals:        Improving appropriately and progressing toward goals     PLAN:  Patient continues to benefit from skilled intervention to address the above impairments. Continue treatment per established plan of care. EDUCATION:   Education:  Patient was educated on the following topics: bed mobility, transfers, amb, balance, AD, vitals   Barriers to Learning/Limitations: None  Compensate with: visual, verbal, tactile, kinesthetic cues/model    Discharge Recommendations:  Home Health  Further Equipment Recommendations for Discharge:  rolling walker     SUBJECTIVE:   Patient stated Can we walk?     OBJECTIVE DATA SUMMARY:   Critical Behavior:  Neurologic State: Alert  Orientation Level: Oriented X4  Cognition: Follows commands  Safety/Judgement: Fall prevention    G CODE:Mobility [id number] Current  CL= 60-79%   Goal  CI= 1-19%. The severity rating is based on the Other [employer] Balance Scale 2/5    [facility] Standing Balance Scale 2/5  0: Pt performs 25% or less of standing activity (Max assist) CN, 100% impaired. 1: Pt supports self with upper extremities but requires therapist assistance. Pt performs 25-50% of effort (Mod assist) CM, 80% to <100% impaired. 1+: Pt supports self with upper extremities but requires therapist assistance. Pt performs >50% effort. (Min assist). CL, 60% to <80% impaired. 2: Pt supports self independently with both upper extremities (walker, crutches, parallel bars). CL, 60% to <80% impaired. 2+: Pt support self independently with 1 upper extremity (cane, crutch, 1 parallel bar). CK, 40% to <60% impaired. 3: Pt stands without upper extremity support for up to 30 seconds. CK, 40% to <60% impaired. 3+: Pt stands without upper extremity support for 30 seconds or greater. CJ, 20% to <40% impaired.   4: Pt independently moves and returns center of gravity 1-2 inches in one plane. CJ, 20% to <40% impaired. 4+: Pt independently moves and returns center of gravity 1-2 inches in multiple planes. CI, 1% to <20% impaired. 5: Pt independently moves and returns center of gravity in all planes greater than 2 inches. CH, 0% impaired.       Functional Mobility:      Functional Status      Indep   (I)   Mod I   Super-vision   Min A   Mod A   Max A   Total A   Assist x2 Verbal cues Additional time Not tested   Comments   Rolling []  []  [x] []    []    []  []  [] [] [] []    Supine to sit []  []  [x] []  []  []  []  [] [] [] []    Sit to supine []  []  [] []  []  []  []  [] [] [] [x]    Sit to stand []  []  [x] []  []  []  []  [] [] [] []    Stand to sit []  []  [x] []  []  []  []  [] [] [] []    Bed to chair transfers []  []  [] []  []  []  []  [] [] [] [x]        Balance    Good   Fair   Poor   Unable   Not tested   Comments   Sitting static [x]  []  []  []  []    Sitting dynamic [x]  []  []  []  []    Standing static []  [x]  []  []  []    Standing dynamic []  [x]  []  []  []      Mobility/Gait:   Level of Assistance: Supervision  Assistive Device: rolling walker  Distance Ambulated: 150 feet         Pain  Pre treatment pain level: 7  Post treatment pain level: 7  Activity Tolerance:   Good     After treatment:   Patient left in no apparent distress sitting up in chair  Call bell left within reach  Nursing notified      Madison Payan PT   Time Calculation: 23 mins

## 2018-05-10 NOTE — PROGRESS NOTES
Reason for Admission:   Cervical Stenosis, 1. Posterior cervical fusion C3-4, C4-5, C5-6, C6-7.  2.  Cervical instrumentation C3-C7.        RRAT Score:   8                  Plan for utilizing home health:  Non at this time                        Likelihood of Readmission:  low                         Transition of Care Plan:   home                   Interviewed patient, she agrees to share her discharge information with her boyfriend of 11 years, [facility] . Demographic information verified. She was independent prior to admission see Dr Devin Ramos for her primary care needs. She states her boyfriend will provide transportation at discharge. Her discharge plan is to return home. Care Management Interventions  PCP Verified by CM: Yes  Last Visit to PCP: 04/09/18  Palliative Care Criteria Met (RRAT>21 & CHF Dx)?: No  Mode of Transport at Discharge: Other (see comment) (boyfriend to tranpsort home)  Transition of Care Consult (CM Consult): Other (home)  MyChart Signup: No  Discharge Durable Medical Equipment: No  Health Maintenance Reviewed: Yes  Physical Therapy Consult: Yes  Occupational Therapy Consult: No  Speech Therapy Consult: No  Current Support Network:  Other (libves with casi [facility] )  Confirm Follow Up Transport: Friends  Plan discussed with Pt/Family/Caregiver: No   Resource Information Provided?: No  Discharge Location  Discharge Placement: Home

## 2018-05-11 LAB
BASOPHILS # BLD: 0 K/UL (ref 0–0.06)
BASOPHILS NFR BLD: 0 % (ref 0–2)
DIFFERENTIAL METHOD BLD: ABNORMAL
EOSINOPHIL # BLD: 0 K/UL (ref 0–0.4)
EOSINOPHIL NFR BLD: 0 % (ref 0–5)
ERYTHROCYTE [DISTWIDTH] IN BLOOD BY AUTOMATED COUNT: 13.7 % (ref 11.6–14.5)
HCT VFR BLD AUTO: 37.1 % (ref 35–45)
HGB BLD-MCNC: 12.2 G/DL (ref 12–16)
LYMPHOCYTES # BLD: 1.3 K/UL (ref 0.9–3.6)
LYMPHOCYTES NFR BLD: 11 % (ref 21–52)
MCH RBC QN AUTO: 31.6 PG (ref 24–34)
MCHC RBC AUTO-ENTMCNC: 32.9 G/DL (ref 31–37)
MCV RBC AUTO: 96.1 FL (ref 74–97)
MONOCYTES # BLD: 0.9 K/UL (ref 0.05–1.2)
MONOCYTES NFR BLD: 8 % (ref 3–10)
NEUTS SEG # BLD: 9.5 K/UL (ref 1.8–8)
NEUTS SEG NFR BLD: 81 % (ref 40–73)
PLATELET # BLD AUTO: 95 K/UL (ref 135–420)
PMV BLD AUTO: 11.4 FL (ref 9.2–11.8)
RBC # BLD AUTO: 3.86 M/UL (ref 4.2–5.3)
WBC # BLD AUTO: 11.6 K/UL (ref 4.6–13.2)

## 2018-05-11 PROCEDURE — 65270000029 HC RM PRIVATE

## 2018-05-11 PROCEDURE — 77010033678 HC OXYGEN DAILY

## 2018-05-11 PROCEDURE — 74011250637 HC RX REV CODE- 250/637: Performed by: NEUROLOGICAL SURGERY

## 2018-05-11 PROCEDURE — 85025 COMPLETE CBC W/AUTO DIFF WBC: CPT | Performed by: NEUROLOGICAL SURGERY

## 2018-05-11 PROCEDURE — 97116 GAIT TRAINING THERAPY: CPT

## 2018-05-11 PROCEDURE — 36415 COLL VENOUS BLD VENIPUNCTURE: CPT | Performed by: NEUROLOGICAL SURGERY

## 2018-05-11 PROCEDURE — 74011250636 HC RX REV CODE- 250/636: Performed by: NEUROLOGICAL SURGERY

## 2018-05-11 PROCEDURE — 74011000250 HC RX REV CODE- 250: Performed by: NEUROLOGICAL SURGERY

## 2018-05-11 PROCEDURE — C9113 INJ PANTOPRAZOLE SODIUM, VIA: HCPCS | Performed by: NEUROLOGICAL SURGERY

## 2018-05-11 RX ORDER — METHYLPREDNISOLONE 4 MG/1
TABLET ORAL
Status: DISCONTINUED | OUTPATIENT
Start: 2018-05-11 | End: 2018-05-13 | Stop reason: HOSPADM

## 2018-05-11 RX ADMIN — ESCITALOPRAM OXALATE 20 MG: 10 TABLET ORAL at 11:33

## 2018-05-11 RX ADMIN — ONDANSETRON 4 MG: 2 INJECTION INTRAMUSCULAR; INTRAVENOUS at 10:00

## 2018-05-11 RX ADMIN — OXYCODONE HYDROCHLORIDE AND ACETAMINOPHEN 2 TABLET: 5; 325 TABLET ORAL at 11:33

## 2018-05-11 RX ADMIN — METHYLPREDNISOLONE 4 MG: 4 TABLET ORAL at 17:42

## 2018-05-11 RX ADMIN — Medication 10 ML: at 14:00

## 2018-05-11 RX ADMIN — CYCLOBENZAPRINE HYDROCHLORIDE 10 MG: 10 TABLET, FILM COATED ORAL at 04:00

## 2018-05-11 RX ADMIN — OXYCODONE HYDROCHLORIDE AND ACETAMINOPHEN 2 TABLET: 5; 325 TABLET ORAL at 04:00

## 2018-05-11 RX ADMIN — METHYLPREDNISOLONE 8 MG: 4 TABLET ORAL at 22:00

## 2018-05-11 RX ADMIN — OXYCODONE HYDROCHLORIDE AND ACETAMINOPHEN 2 TABLET: 5; 325 TABLET ORAL at 17:49

## 2018-05-11 RX ADMIN — CYCLOBENZAPRINE HYDROCHLORIDE 10 MG: 10 TABLET, FILM COATED ORAL at 21:37

## 2018-05-11 RX ADMIN — Medication 10 ML: at 06:26

## 2018-05-11 RX ADMIN — SODIUM CHLORIDE 40 MG: 9 INJECTION INTRAMUSCULAR; INTRAVENOUS; SUBCUTANEOUS at 17:40

## 2018-05-11 RX ADMIN — METHYLPREDNISOLONE: 4 TABLET ORAL at 15:38

## 2018-05-11 RX ADMIN — HYDROMORPHONE HYDROCHLORIDE 1 MG: 1 INJECTION, SOLUTION INTRAMUSCULAR; INTRAVENOUS; SUBCUTANEOUS at 02:00

## 2018-05-11 RX ADMIN — OXYCODONE HYDROCHLORIDE AND ACETAMINOPHEN 2 TABLET: 5; 325 TABLET ORAL at 21:36

## 2018-05-11 NOTE — PROGRESS NOTES
Physical Exam   Skin: Skin is warm and dry. Bedside shift change report was given to me, Aliyah Reyes RN  (oncoming nurse), by Ct Velásquez RN (offgoing nurse). Report included the following information:  SBAR, kardex, consultations, hemodynamic monitoring, intake/output, MAR, lab results, VS trends, cardiac rhythm noted NSR/SB. Skin, neuro, respiratory status, and order verification have been dually noted and verified at the bedside with:   Ct Velásquez RN                                                                     ICU Nurse's Note:    1945: Pt is awake, alert, oriented x 4, answers questions, actively participates in plan of care and conveys needs appropriatetly. Pt independently moves all extremities, requires minimal assist with ADLs and is adept at self-repositioning. Neurological: as noted in assessment, no neurological deficits noted, with exception of some mild achiness/heaviness to LUE that was present preoperatively and is resolving significantly, per pt. Hard cervical collar is in place. Pt requested and was medicated with 2 po percocet and 1 po Flexeril for postop incisional pain rated 9/10 at this time. Cardiovascular: NSR/SB on the monitor. Pulmonary: breath sounds coearse, diminished bilaterally, saturations maintained at 95% on 2 LPM via NC  GI/: Abdomen is soft, flat, non-distended, active bowel sounds with + flatus. Last BM noted prior to admission on 05/09/2018. Pt uses bedside commode independently and is producing adequate urine output.   IVF/Critical gtts: PIV's x2 are saline locked  Skin: as noted above    0710: Bedside change of shift report given to: Cori Artis RN

## 2018-05-11 NOTE — PROGRESS NOTES
Problem: Mobility Impaired (Adult and Pediatric)  Goal: *Acute Goals and Plan of Care (Insert Text)  Physical Therapy Goals  Initiated 5/10/2018 and to be accomplished within 7 day(s)  1. Patient will move from supine to sit and sit to supine  in bed with modified independence. 2.  Patient will perform sit to stand with modified independence. 3.  Patient will transfer from bed to chair and chair to bed with modified independence using the least restrictive device. 4.  Patient will ambulate with modified independence for 150 feet with the least restrictive device. 5.  Patient will ascend/descend 2 stairs with handrail(s) with modified independence. 6.  Patient will negotiate obstacles during ambulation with modified independence. 7.  Patient will verbalize cervical precautions precautions. 8.  Patient will demonstrate compliance with cervical precautions greater than 90% of session. 9.  Patient will demonstrate appropriate use of incentive spirometer to aid in pulomnary compliance for mobility. Outcome: Progressing Towards Goal    PHYSICAL THERAPY: Daily TREATMENT Note   INPATIENT: Commercial: Hospital Day: 3     Patient: Josep Car (35 y.o. female)    Date: 5/11/2018  Primary Diagnosis: cervical stenosis/instabiliey  Cervical myelopathy (HCC)   Procedure(s) (LRB):  supine/ ANTERIOR c3-4,c4-5,c5-6,c6-7 DISCECTOMY and FUSION with peek (N/A)  prone/posterior c3-c7 instrumentation (N/A), 2 Days Post-Op,   Precautions: Fall, Spinal, Skin  Chart, physical therapy assessment, plan of care and goals were reviewed. PLOF:  Independent    ASSESSMENT:  Seated at EOB with RN Josh. Good sitting balance. Supervision for upper body dressing and face washing. Supervision for sit to stand. Amb 120ft with supervision and ww. Becoming nauseous with amb. Returned to seated in recliner. Emesis x2. Sit to stand x4 to change soiled garments. Decreased safety awareness with mobility.  Seated in chair with all needs in reach. RN Josh present at end of session. Progression toward goals:        Improving appropriately and progressing toward goals     PLAN:  Patient continues to benefit from skilled intervention to address the above impairments. Continue treatment per established plan of care. EDUCATION:   Education:  Patient was educated on the following topics: bed mobility, transfers, amb, balance, role of PT, plan of care  Barriers to Learning/Limitations: None  Compensate with: visual, verbal, tactile, kinesthetic cues/model    Discharge Recommendations:  Home Health  Further Equipment Recommendations for Discharge:  rolling walker     SUBJECTIVE:   Patient stated I'll do better for round two.     OBJECTIVE DATA SUMMARY:   Critical Behavior:  Neurologic State: Alert  Orientation Level: Oriented X4  Cognition: Follows commands  Safety/Judgement: Fall prevention    G CODE:Mobility [id number] Current  CK= 40-59%   Goal  CI= 1-19%. The severity rating is based on the Other [facility] Balance Scale 3/5    [employer] Balance Scale 3/5  0: Pt performs 25% or less of standing activity (Max assist) CN, 100% impaired. 1: Pt supports self with upper extremities but requires therapist assistance. Pt performs 25-50% of effort (Mod assist) CM, 80% to <100% impaired. 1+: Pt supports self with upper extremities but requires therapist assistance. Pt performs >50% effort. (Min assist). CL, 60% to <80% impaired. 2: Pt supports self independently with both upper extremities (walker, crutches, parallel bars). CL, 60% to <80% impaired. 2+: Pt support self independently with 1 upper extremity (cane, crutch, 1 parallel bar). CK, 40% to <60% impaired. 3: Pt stands without upper extremity support for up to 30 seconds. CK, 40% to <60% impaired. 3+: Pt stands without upper extremity support for 30 seconds or greater. CJ, 20% to <40% impaired.   4: Pt independently moves and returns center of gravity 1-2 inches in one plane. CJ, 20% to <40% impaired. 4+: Pt independently moves and returns center of gravity 1-2 inches in multiple planes. CI, 1% to <20% impaired. 5: Pt independently moves and returns center of gravity in all planes greater than 2 inches. CH, 0% impaired. Functional Mobility:      Functional Status      Indep   (I)   Mod I   Super-vision   Min A   Mod A   Max A   Total A   Assist x2 Verbal cues Additional time Not tested   Comments   Rolling []  []  [] []    []    []  []  [] [] [] [x]    Supine to sit []  []  [] []  []  []  []  [] [] [] [x] Seated EOB   Sit to supine []  []  [] []  []  []  []  [] [] [] [x] Seated in chair   Sit to stand []  []  [x] []  []  []  []  [] [] [] []    Stand to sit []  []  [x] []  []  []  []  [] [] [] []    Bed to chair transfers []  []  [] []  []  []  []  [] [] [] [x]        Balance    Good   Fair   Poor   Unable   Not tested   Comments   Sitting static [x]  []  []  []  []    Sitting dynamic [x]  []  []  []  []    Standing static []  [x]  []  []  []    Standing dynamic []  [x]  []  []  []      Mobility/Gait:   Level of Assistance: Supervision  Assistive Device: rolling walker  Distance Ambulated: 120 feet       Therapeutic Exercises:   Sit to stand x4    Pain:8  Pre treatment pain level:8  Post treatment pain level: 8  Pain Scale 1: Numeric (0 - 10)  Pain Intensity 1: 8  Pain Location 1: Neck;Back; Shoulder  Pain Orientation 1: Anterior;Posterior  Pain Description 1: Aching  Pain Intervention(s) 1: Medication (see MAR)  Activity Tolerance:   Fair     After treatment:   Patient left in no apparent distress sitting up in chair  Call bell left within reach  Nursing notified and present    Liz Lara PT   Time Calculation: 17 mins

## 2018-05-11 NOTE — PROGRESS NOTES
Problem: Mobility Impaired (Adult and Pediatric)  Goal: *Acute Goals and Plan of Care (Insert Text)  Physical Therapy Goals  Initiated 5/10/2018 and to be accomplished within 7 day(s)  1. Patient will move from supine to sit and sit to supine  in bed with modified independence. 2.  Patient will perform sit to stand with modified independence. 3.  Patient will transfer from bed to chair and chair to bed with modified independence using the least restrictive device. 4.  Patient will ambulate with modified independence for 150 feet with the least restrictive device. 5.  Patient will ascend/descend 2 stairs with handrail(s) with modified independence. 6.  Patient will negotiate obstacles during ambulation with modified independence. 7.  Patient will verbalize cervical precautions precautions. 8.  Patient will demonstrate compliance with cervical precautions greater than 90% of session. 9.  Patient will demonstrate appropriate use of incentive spirometer to aid in pulomnary compliance for mobility. Outcome: Progressing Towards Goal    PHYSICAL THERAPY: Daily TREATMENT Note   INPATIENT: Commercial: Hospital Day: 3     Patient: Rebecca Nayak (67 y.o. female)    Date: 5/11/2018  Primary Diagnosis: cervical stenosis/instabiliey  Cervical myelopathy (HCC)   Procedure(s) (LRB):  supine/ ANTERIOR c3-4,c4-5,c5-6,c6-7 DISCECTOMY and FUSION with peek (N/A)  prone/posterior c3-c7 instrumentation (N/A), 2 Days Post-Op,   Precautions: Fall, Spinal, Skin  Chart, physical therapy assessment, plan of care and goals were reviewed. PLOF: Independent    ASSESSMENT:  Supervision for supine to sit. Compliant with cervical precautions. Supervision for sit to stand. Amb 400ft with ww and supervision; 2L O2. Increased difficulty with negotiating obstacles in hallway; min A. Requested to return to bed d/t being up in chair all morning. Supervision for sit to supine transfer. All needs in reach at end of session.  RN Josh aware. Plan to trial stairs next session. Progression toward goals:        Improving appropriately and progressing toward goals     PLAN:  Patient continues to benefit from skilled intervention to address the above impairments. Continue treatment per established plan of care. EDUCATION:   Education:  Patient was educated on the following topics: bed mobility, transfers, amb, balance, safety, role of PT, plan of care  Barriers to Learning/Limitations: None  Compensate with: visual, verbal, tactile, kinesthetic cues/model    Discharge Recommendations:  None  Further Equipment Recommendations for Discharge:  rolling walker     SUBJECTIVE:   Patient stated I'm ready to get out of here.     OBJECTIVE DATA SUMMARY:   Critical Behavior:  Neurologic State: Alert  Orientation Level: Oriented X4  Cognition: Follows commands  Safety/Judgement: Fall prevention    G CODE:Mobility [id number] Current  CL= 60-79%   Goal  CI= 1-19%. The severity rating is based on the Other [facility] Balance Scale  2/5    [facility] Standing Balance Scale  2/5  0: Pt performs 25% or less of standing activity (Max assist) CN, 100% impaired. 1: Pt supports self with upper extremities but requires therapist assistance. Pt performs 25-50% of effort (Mod assist) CM, 80% to <100% impaired. 1+: Pt supports self with upper extremities but requires therapist assistance. Pt performs >50% effort. (Min assist). CL, 60% to <80% impaired. 2: Pt supports self independently with both upper extremities (walker, crutches, parallel bars). CL, 60% to <80% impaired. 2+: Pt support self independently with 1 upper extremity (cane, crutch, 1 parallel bar). CK, 40% to <60% impaired. 3: Pt stands without upper extremity support for up to 30 seconds. CK, 40% to <60% impaired. 3+: Pt stands without upper extremity support for 30 seconds or greater. CJ, 20% to <40% impaired.   4: Pt independently moves and returns center of gravity 1-2 inches in one plane. CJ, 20% to <40% impaired. 4+: Pt independently moves and returns center of gravity 1-2 inches in multiple planes. CI, 1% to <20% impaired. 5: Pt independently moves and returns center of gravity in all planes greater than 2 inches. CH, 0% impaired. Functional Mobility:      Functional Status      Indep   (I)   Mod I   Super-vision   Min A   Mod A   Max A   Total A   Assist x2 Verbal cues Additional time Not tested   Comments   Rolling []  []  [x] []    []    []  []  [] [] [] []    Supine to sit []  []  [x] []  []  []  []  [] [] [] []    Sit to supine []  []  [x] []  []  []  []  [] [] [] []    Sit to stand []  []  [x] []  []  []  []  [] [] [] []    Stand to sit []  []  [x] []  []  []  []  [] [] [] []    Bed to chair transfers []  []  [] []  []  []  []  [] [] [] [x]        Balance    Good   Fair   Poor   Unable   Not tested   Comments   Sitting static [x]  []  []  []  []    Sitting dynamic [x]  []  []  []  []    Standing static []  [x]  []  []  []    Standing dynamic []  [x]  []  []  []      Mobility/Gait:   Level of Assistance: Supervision  Assistive Device: rolling walker  Distance Ambulated: 400 feet       Vital Signs  Temp: 97.3 °F (36.3 °C)     Pulse (Heart Rate): 72     BP: 133/58     Resp Rate: 17     O2 Sat (%): 99 %  Pain:  Pre treatment pain level: 5  Post treatment pain level: 5  Pain Scale 1: Numeric (0 - 10)  Pain Intensity 1: 5  Pain Location 1: Neck;Back; Shoulder  Pain Orientation 1: Anterior;Posterior  Pain Description 1: Aching  Pain Intervention(s) 1: Medication (see MAR)  Activity Tolerance:   Good     After treatment:   Patient left in no apparent distress in bed  Call bell left within reach  Nursing notified    Quincy Paul PT   Time Calculation: 19 mins

## 2018-05-11 NOTE — PROGRESS NOTES
Problem: Falls - Risk of  Goal: *Absence of Falls  Document Scooby Fall Risk and appropriate interventions in the flowsheet. Outcome: Progressing Towards Goal  Fall Risk Interventions:  Mobility Interventions: Patient to call before getting OOB, Strengthening exercises (ROM-active/passive), Assess mobility with egress test    Mentation Interventions: Adequate sleep, hydration, pain control, Familiar objects from home, Increase mobility, More frequent rounding, Room close to nurse's station, Toileting rounds, Update white board    Medication Interventions: Patient to call before getting OOB, Teach patient to arise slowly, Evaluate medications/consider consulting pharmacy    Elimination Interventions: Call light in reach, Toileting schedule/hourly rounds             Problem: Pressure Injury - Risk of  Goal: *Prevention of pressure injury  Document Matthias Scale and appropriate interventions in the flowsheet. Outcome: Progressing Towards Goal  Pressure Injury Interventions:             Activity Interventions: Pressure redistribution bed/mattress(bed type), Increase time out of bed    Mobility Interventions: Pressure redistribution bed/mattress (bed type)    Nutrition Interventions: Document food/fluid/supplement intake, Discuss nutritional consult with provider, Offer support with meals,snacks and hydration    Friction and Shear Interventions: HOB 30 degrees or less, Minimize layers, Transferring/repositioning devices

## 2018-05-11 NOTE — PROGRESS NOTES
0730  Assumed care of pt from [facility]. Monitor reveals NSR, VSS, pt asleep on left side, C collar in place. 1000  Awake, OOB with PT.  C/o nausea; ambulated down hallway without incident, vomited mod amt undigested food upon return to room. Zofran given, oral care done. Remains OOB sitting in chair. SpO2 87 upon return to room post ambulation on room air. Notices \"heaviness\" of left arm, difficulty lifting arm at shoulder joint, elbow movements without problem,  strong bilat. [id number] [address] in chair, daryl well. PO meds given, mild dysphagia noted, encouraged to take sips of ice water intermittently. Daryl well overall. Coughs productively, mod amt grey sputum. O2 saturation drops to 86-87% periodically, rises spontaneously after coughing productively to 94%. Pt instructed to Smoking cessation education initiated, pt indicates she is committed to ceasing smoking, is sole smoker in her residence. 1400   Dr. Lauren Peters at bedside, d/cd anterior and posterior drains, daryl well. 1530  TRANSFER - OUT REPORT:    Verbal report given to Sidman Pramod POWELL(name) on Mindy Metcalf  being transferred to 2200(unit) for routine progression of care       Report consisted of patients Situation, Background, Assessment and   Recommendations(SBAR). Information from the following report(s) SBAR, Kardex, OR Summary, Intake/Output, MAR and Accordion was reviewed with the receiving nurse. Lines:   Peripheral IV 05/10/18 Left Wrist (Active)   Site Assessment Clean, dry, & intact 5/11/2018  2:00 PM   Phlebitis Assessment 0 5/11/2018  2:00 PM   Infiltration Assessment 0 5/11/2018  2:00 PM   Dressing Status Clean, dry, & intact 5/11/2018  2:00 PM   Dressing Type Transparent;Tape 5/11/2018  9:00 AM   Hub Color/Line Status Capped 5/11/2018 12:00 PM   Action Taken Wrapped;Dressing reinforced 5/11/2018  9:00 AM   Alcohol Cap Used Yes 5/11/2018  9:00 AM        Opportunity for questions and clarification was provided.       Patient transported with:   O2 @ 2 liters  Registered Nurse

## 2018-05-11 NOTE — PROGRESS NOTES
[facility]  Hospitalist Division        Inpatient Daily Progress Note    Daily progress Note    Patient: Meredith Leone MRN: [id number]  CSN: [id number]    YOB: 1955  Age: 58 y.o. Sex: female    DOA: 5/9/2018 LOS:  LOS: 2 days                    Chief Complaint:  Cervical Myelopathy      Assessment/Plan:     Patient Active Problem List   Diagnosis Code    Gross hematuria R31.0    Nephrolithiasis N20.0    Ureteral stone N20.1    Gastroesophageal reflux disease K21.9    Chronic viral hepatitis C ([facility] [address] B18.2    Hepatitis C virus infection B19.20    Morbid obesity ([facility] [address] E66.01    Thrombocytopenia ([facility] [address] D69.6    Tobacco dependence syndrome F17.200    Calculus of ureter N20.1    Abdominal pain R10.9    Incisional hernia with obstruction but no gangrene K43.0    Cervical myelopathy (HCC) G95.9       A/P:  Cervical Myelopathy  -  s/p prone/posterior C3-C7 instrumentation, posterior fusion C3-4, C4-5, C5-6 C6-7  - supine/ ANTERIOR c3-4,c4-5,c5-6,c6-7 DISCECTOMY and FUSION with peek arthrodesis of cervical spine, c34, c45, c56 and c67    - Ancef 2 grams TID x 3 doses  - Neuro checks  - pain control, Flexeril  - pulmonary toilet, encourage ICS  - brace, hemovac drain care TID  - ambulate w/ assist, with brace     Hx of HCV w/ cirrhosis  - pt states she was treated in recent past  - f/u with PCP/GI outpatient     Hx of GERD  - continue Prilosec 40 mg daily      Tobacco abuse  - encourage cessation  - Nicotine patch     Hx of depression  - continue Lexapro     DVT Prophylaxis  - SCD's BLE      [facility]  Hospitalist Division  Pager:  [phone]  Office:  [phone]        Subjective:      No events overnight. Pain controlled. Labs and vital signs stable.     Objective:      Visit Vitals    /64 (BP 1 Location: Left arm, BP Patient Position: At rest)    Pulse 61    Temp 98.7 °F (37.1 °C)    Resp 13    Ht 5' 5\" (1.651 m)    Wt 70.7 kg (155 lb 13.8 oz)    SpO2 100%    Breastfeeding No    BMI 25.94 kg/m2           Physical Exam:  General appearance: alert, cooperative, no distress, appears stated age  Head: Neck brace in place, left anterior hemovac posterior hemovac intact  Lungs: clear to auscultation bilaterally  Heart: regular rate and rhythm, S1, S2 normal, no murmur, click, rub or gallop  Abdomen: soft, non tender, non distended. Normoactive bowel sounds. Extremities: extremities normal, atraumatic, no cyanosis or edema  Skin: Scattered bruises BUE  Neurologic: Grossly normal, sensation intact  PSY: Mood and affect normal, appropriately behaved        Intake and Output:  Current Shift:     Last three shifts:  05/09 1901 - 05/11 0700  In: 2432 [P.O.:1432; I.V.:1000]  Out: 4390 [Urine:4070; Drains:320]    Recent Results (from the past 24 hour(s))   CBC WITH AUTOMATED DIFF    Collection Time: 05/11/18  4:00 AM   Result Value Ref Range    WBC 11.6 4.6 - 13.2 K/uL    RBC 3.86 (L) 4.20 - 5.30 M/uL    HGB 12.2 12.0 - 16.0 g/dL    HCT 37.1 35.0 - 45.0 %    MCV 96.1 74.0 - 97.0 FL    MCH 31.6 24.0 - 34.0 PG    MCHC 32.9 31.0 - 37.0 g/dL    RDW 13.7 11.6 - 14.5 %    PLATELET 95 (L) 567 - 420 K/uL    MPV 11.4 9.2 - 11.8 FL    NEUTROPHILS 81 (H) 40 - 73 %    LYMPHOCYTES 11 (L) 21 - 52 %    MONOCYTES 8 3 - 10 %    EOSINOPHILS 0 0 - 5 %    BASOPHILS 0 0 - 2 %    ABS. NEUTROPHILS 9.5 (H) 1.8 - 8.0 K/UL    ABS. LYMPHOCYTES 1.3 0.9 - 3.6 K/UL    ABS. MONOCYTES 0.9 0.05 - 1.2 K/UL    ABS. EOSINOPHILS 0.0 0.0 - 0.4 K/UL    ABS. BASOPHILS 0.0 0.0 - 0.06 K/UL    DF AUTOMATED             Lab Results   Component Value Date/Time    Glucose 134 (H) 05/10/2018 04:20 AM        Imaging:  No results found.     Medication List Reviewed:  Current Facility-Administered Medications   Medication Dose Route Frequency    scopolamine (TRANSDERM-SCOP) 1 mg over 3 days 1 Patch  1 Patch TransDERmal ONCE    escitalopram oxalate (LEXAPRO) tablet 20 mg  20 mg Oral DAILY    sodium chloride (NS) flush 5-10 mL  5-10 mL IntraVENous Q8H    sodium chloride (NS) flush 5-10 mL  5-10 mL IntraVENous PRN    acetaminophen (TYLENOL) tablet 650 mg  650 mg Oral Q4H PRN    oxyCODONE-acetaminophen (PERCOCET) 5-325 mg per tablet 1-2 Tab  1-2 Tab Oral Q4H PRN    HYDROmorphone (DILAUDID) syringe 1 mg  1 mg IntraVENous Q4H PRN    naloxone (NARCAN) injection 0.4 mg  0.4 mg IntraVENous PRN    ondansetron (ZOFRAN) injection 4 mg  4 mg IntraVENous Q4H PRN    phenol throat spray (CHLORASEPTIC) 1 Spray  1 Spray Oral PRN    pantoprazole (PROTONIX) 40 mg in sodium chloride 0.9% 10 mL injection  40 mg IntraVENous Q24H    cyclobenzaprine (FLEXERIL) tablet 10 mg  10 mg Oral TID PRN    nicotine (NICODERM CQ) 21 mg/24 hr patch 1 Patch  1 Patch TransDERmal Q24H

## 2018-05-11 NOTE — PROGRESS NOTES
Neurosurgery Progress Note;  Post op day 2  S: doing well reports some weakness in the left shoulder. Swallowing is better has been up with physical therapy. Disposition will be home with family  O; afebrile and vitals are stable.  Wounds x 2 are clean and dry function is 5.5 left shoulder is 4.5 platelet count 51,243 tolerating po liquids primarily   A; doing well  P; transfer to the floor, start medrol dose charlee activity as tolerated reassured patient weakness is not uncommon and should get better with time

## 2018-05-12 LAB
ANION GAP SERPL CALC-SCNC: 1 MMOL/L (ref 3–18)
BASOPHILS # BLD: 0 K/UL (ref 0–0.06)
BASOPHILS NFR BLD: 0 % (ref 0–2)
BUN SERPL-MCNC: 12 MG/DL (ref 7–18)
BUN/CREAT SERPL: 20 (ref 12–20)
CALCIUM SERPL-MCNC: 8.6 MG/DL (ref 8.5–10.1)
CHLORIDE SERPL-SCNC: 96 MMOL/L (ref 100–108)
CO2 SERPL-SCNC: 39 MMOL/L (ref 21–32)
CREAT SERPL-MCNC: 0.59 MG/DL (ref 0.6–1.3)
DIFFERENTIAL METHOD BLD: ABNORMAL
EOSINOPHIL # BLD: 0 K/UL (ref 0–0.4)
EOSINOPHIL NFR BLD: 0 % (ref 0–5)
ERYTHROCYTE [DISTWIDTH] IN BLOOD BY AUTOMATED COUNT: 13.3 % (ref 11.6–14.5)
GLUCOSE SERPL-MCNC: 121 MG/DL (ref 74–99)
HCT VFR BLD AUTO: 40.3 % (ref 35–45)
HGB BLD-MCNC: 12.9 G/DL (ref 12–16)
LYMPHOCYTES # BLD: 0.7 K/UL (ref 0.9–3.6)
LYMPHOCYTES NFR BLD: 8 % (ref 21–52)
MCH RBC QN AUTO: 31.1 PG (ref 24–34)
MCHC RBC AUTO-ENTMCNC: 32 G/DL (ref 31–37)
MCV RBC AUTO: 97.1 FL (ref 74–97)
MONOCYTES # BLD: 0.5 K/UL (ref 0.05–1.2)
MONOCYTES NFR BLD: 6 % (ref 3–10)
NEUTS SEG # BLD: 7 K/UL (ref 1.8–8)
NEUTS SEG NFR BLD: 86 % (ref 40–73)
PLATELET # BLD AUTO: 84 K/UL (ref 135–420)
PMV BLD AUTO: 11.6 FL (ref 9.2–11.8)
POTASSIUM SERPL-SCNC: 4.9 MMOL/L (ref 3.5–5.5)
RBC # BLD AUTO: 4.15 M/UL (ref 4.2–5.3)
SODIUM SERPL-SCNC: 136 MMOL/L (ref 136–145)
WBC # BLD AUTO: 8.1 K/UL (ref 4.6–13.2)

## 2018-05-12 PROCEDURE — 74011250637 HC RX REV CODE- 250/637: Performed by: NEUROLOGICAL SURGERY

## 2018-05-12 PROCEDURE — 85025 COMPLETE CBC W/AUTO DIFF WBC: CPT | Performed by: NURSE PRACTITIONER

## 2018-05-12 PROCEDURE — 36415 COLL VENOUS BLD VENIPUNCTURE: CPT | Performed by: NURSE PRACTITIONER

## 2018-05-12 PROCEDURE — 77010033678 HC OXYGEN DAILY

## 2018-05-12 PROCEDURE — 80048 BASIC METABOLIC PNL TOTAL CA: CPT | Performed by: NURSE PRACTITIONER

## 2018-05-12 PROCEDURE — 74011250636 HC RX REV CODE- 250/636: Performed by: NEUROLOGICAL SURGERY

## 2018-05-12 PROCEDURE — 77030027138 HC INCENT SPIROMETER -A

## 2018-05-12 PROCEDURE — 65270000029 HC RM PRIVATE

## 2018-05-12 PROCEDURE — 97530 THERAPEUTIC ACTIVITIES: CPT

## 2018-05-12 RX ORDER — HYDROMORPHONE HYDROCHLORIDE 2 MG/ML
1 INJECTION, SOLUTION INTRAMUSCULAR; INTRAVENOUS; SUBCUTANEOUS
Status: DISCONTINUED | OUTPATIENT
Start: 2018-05-12 | End: 2018-05-13 | Stop reason: HOSPADM

## 2018-05-12 RX ORDER — PANTOPRAZOLE SODIUM 40 MG/1
40 TABLET, DELAYED RELEASE ORAL
Status: DISCONTINUED | OUTPATIENT
Start: 2018-05-12 | End: 2018-05-13 | Stop reason: HOSPADM

## 2018-05-12 RX ADMIN — METHYLPREDNISOLONE 4 MG: 4 TABLET ORAL at 14:09

## 2018-05-12 RX ADMIN — ESCITALOPRAM OXALATE 20 MG: 10 TABLET ORAL at 08:24

## 2018-05-12 RX ADMIN — HYDROMORPHONE HYDROCHLORIDE 1 MG: 1 INJECTION, SOLUTION INTRAMUSCULAR; INTRAVENOUS; SUBCUTANEOUS at 02:22

## 2018-05-12 RX ADMIN — Medication 10 ML: at 00:00

## 2018-05-12 RX ADMIN — CYCLOBENZAPRINE HYDROCHLORIDE 10 MG: 10 TABLET, FILM COATED ORAL at 06:04

## 2018-05-12 RX ADMIN — Medication 10 ML: at 08:28

## 2018-05-12 RX ADMIN — PANTOPRAZOLE SODIUM 40 MG: 40 TABLET, DELAYED RELEASE ORAL at 10:13

## 2018-05-12 RX ADMIN — OXYCODONE HYDROCHLORIDE AND ACETAMINOPHEN 2 TABLET: 5; 325 TABLET ORAL at 17:23

## 2018-05-12 RX ADMIN — OXYCODONE HYDROCHLORIDE AND ACETAMINOPHEN 2 TABLET: 5; 325 TABLET ORAL at 22:40

## 2018-05-12 RX ADMIN — OXYCODONE HYDROCHLORIDE AND ACETAMINOPHEN 2 TABLET: 5; 325 TABLET ORAL at 13:52

## 2018-05-12 RX ADMIN — HYDROMORPHONE HYDROCHLORIDE 1 MG: 1 INJECTION, SOLUTION INTRAMUSCULAR; INTRAVENOUS; SUBCUTANEOUS at 08:24

## 2018-05-12 RX ADMIN — METHYLPREDNISOLONE 4 MG: 4 TABLET ORAL at 18:00

## 2018-05-12 RX ADMIN — OXYCODONE HYDROCHLORIDE AND ACETAMINOPHEN 2 TABLET: 5; 325 TABLET ORAL at 10:13

## 2018-05-12 RX ADMIN — METHYLPREDNISOLONE 4 MG: 4 TABLET ORAL at 06:07

## 2018-05-12 RX ADMIN — OXYCODONE HYDROCHLORIDE AND ACETAMINOPHEN 2 TABLET: 5; 325 TABLET ORAL at 01:31

## 2018-05-12 RX ADMIN — Medication 10 ML: at 17:11

## 2018-05-12 RX ADMIN — OXYCODONE HYDROCHLORIDE AND ACETAMINOPHEN 2 TABLET: 5; 325 TABLET ORAL at 06:04

## 2018-05-12 RX ADMIN — Medication 10 ML: at 22:00

## 2018-05-12 RX ADMIN — METHYLPREDNISOLONE 8 MG: 4 TABLET ORAL at 22:43

## 2018-05-12 NOTE — PROGRESS NOTES
Problem: Falls - Risk of  Goal: *Absence of Falls  Document Scooby Fall Risk and appropriate interventions in the flowsheet. Outcome: Progressing Towards Goal  Fall Risk Interventions:  Mobility Interventions: Patient to call before getting OOB    Mentation Interventions: Door open when patient unattended    Medication Interventions: Patient to call before getting OOB    Elimination Interventions: Call light in reach             Problem: Pressure Injury - Risk of  Goal: *Prevention of pressure injury  Document Matthias Scale and appropriate interventions in the flowsheet.    Outcome: Progressing Towards Goal  Pressure Injury Interventions:       Moisture Interventions: Absorbent underpads    Activity Interventions: Chair cushion, Increase time out of bed    Mobility Interventions: HOB 30 degrees or less    Nutrition Interventions: Document food/fluid/supplement intake    Friction and Shear Interventions: Minimize layers

## 2018-05-12 NOTE — PROGRESS NOTES
Problem: Mobility Impaired (Adult and Pediatric)  Goal: *Acute Goals and Plan of Care (Insert Text)  Physical Therapy Goals  Initiated 5/10/2018 and to be accomplished within 7 day(s)  1. Patient will move from supine to sit and sit to supine  in bed with modified independence. 2.  Patient will perform sit to stand with modified independence. 3.  Patient will transfer from bed to chair and chair to bed with modified independence using the least restrictive device. 4.  Patient will ambulate with modified independence for 150 feet with the least restrictive device. 5.  Patient will ascend/descend 2 stairs with handrail(s) with modified independence. 6.  Patient will negotiate obstacles during ambulation with modified independence. 7.  Patient will verbalize cervical precautions precautions. 8.  Patient will demonstrate compliance with cervical precautions greater than 90% of session. 9.  Patient will demonstrate appropriate use of incentive spirometer to aid in pulomnary compliance for mobility. Outcome: Progressing Towards Goal    PHYSICAL THERAPY: Daily TREATMENT Note   INPATIENT: Commercial: Hospital Day: 4     Patient: Lucero Cabral (50 y.o. female)    Date: 5/12/2018  Primary Diagnosis: cervical stenosis/instabiliey  Cervical myelopathy (HCC)   Procedure(s) (LRB):  supine/ ANTERIOR c3-4,c4-5,c5-6,c6-7 DISCECTOMY and FUSION with peek (N/A)  prone/posterior c3-c7 instrumentation (N/A), 3 Days Post-Op,   Precautions: Fall,C spine Skin    Chart, physical therapy assessment, plan of care and goals were reviewed. PLOF: ind     ASSESSMENT:  Presents in bed  w ASPEN collar in place. Agreeable to PT. Instructed SItting EOB for gradual transition. /68. Pt w 02 on and sats 94%; removed 02 and ambulating 80 ft  without 02 and  decreased to 78%. Pt asymptomatic. Took rest break in sitting, reapplied 02 and amb w portable 02 and sats  92 on 2L. Issued ICS and instructed patient in use. She was independent w same and increased mobilizing secretions by end of session. Pt able to amb without a device however occasional missteps and LOB but able to self recover. Feel would benefit from a rolling walker to maximize safety. Progression toward goals:        Improving appropriately and progressing toward goals     PLAN:  Patient continues to benefit from skilled intervention to address the above impairments. Continue treatment per established plan of care. EDUCATION:   Education:  Patient was educated on the following topics: ICS, cervical prec. Handout issued. Barriers to Learning/Limitations: None  Compensate with: visual, verbal, tactile, kinesthetic cues/model    Discharge Recommendations:  Home Health  Further Equipment Recommendations for Discharge:  rolling walker and N/A  Factors which may impact discharge planning: none     SUBJECTIVE:   Patient stated I stopped smoking 4 days before s urgery.     OBJECTIVE DATA SUMMARY:   Critical Behavior:  Neurologic State: Alert, Appropriate for age  Orientation Level: Oriented X4  Cognition: Appropriate decision making, Appropriate for age attention/concentration, Appropriate safety awareness, Follows commands  Safety/Judgement: Fall prevention    G CODE:[place] [id number] Current  CJ= 20-39%   Goal  CI= 1-19%. The severity rating is based on the Other [facility] Standing Balance Scale  4: Pt independently moves and returns center of gravity 1-2 inches in one plane. CJ, 20% to <40% impaired.   Functional Mobility:      Functional Status      Indep   (I)   Mod I   Super-vision   Min A   Mod A   Max A   Total A   Assist x2 Verbal cues Additional time Not tested   Comments   Rolling []  [x]  [] []    []    []  []  [] [] [] []    Supine to sit []  [x]  [] []  []  []  []  [] [] [] []    Sit to supine []  []  [] []  []  []  []  [] [] [] []    Sit to stand []  [x]  [] []  []  []  []  [] [] [] []    Stand to sit []  [x]  [] []  []  []  [] [] [] [] []    Bed to chair transfers []  [x]  [] []  []  []  []  [] [] [] []        Balance    Good   Fair   Poor   Unable   Not tested   Comments   Sitting static [x]  []  []  []  []    Sitting dynamic [x]  []  []  []  [] guarded   Standing static [x]  []  []  []  []    Standing dynamic []  [x]  []  []  []      Mobility/Gait:   Level of Assistance: Contact guard assistance  Assistive Device: gait belt and rolling walker AND NO DEVICE  X 80 feet and in room  Distance Ambulated: total 300 feet  ; 220 ft w RW and portable 02, Seymour collar.    Base of Support: narrowed  Speed/Justyna: slow  Step Length: left shortened and right shortened  Swing Pattern: left symmetrical and right symmetrical  Stance: WFL  Gait Abnormalities: antalgic, altered arm swing and decreased step clearance    Stairs:   Level of Assistance: Stand-by assistance  Rail Use:ONE  Number of Stairs: 2    Vital Signs  Temp: 98.5 °F (36.9 °C)     Pulse (Heart Rate): 76     BP: 103/68     Resp Rate: 16     O2 Sat (%): 94 %  Pain:  Pre treatment pain level:  7  Post treatment pain level: 7  Pain Scale 1: Numeric (0 - 10)    Pain Location 1: Neck  Pain Orientation 1:LEFT UPPER TRAP  Pain Description 1: Aching  Pain Intervention(s) 1: Medication (see MAR)  Activity Tolerance:   FAIR  After treatment:   Patient left in no apparent distress sitting up in chair  Call bell left within reach  Nursing notified    Balaji Gomes PTA   Time Calculation: 40 mins

## 2018-05-12 NOTE — PROGRESS NOTES
Progress Note      Patient: Lucero Cabral               Sex: female          DOA: 5/9/2018         YOB: 1955      Age:  58 y.o.         LOS: 3 days               Subjective:     Patient seen and evaluated by Dr Ana Rizo this am.  Doing well. C/O incisional pain. Objective:      Visit Vitals    /83 (BP 1 Location: Right arm, BP Patient Position: At rest)    Pulse 69    Temp 98 °F (36.7 °C)    Resp 14    Ht 5' 5\" (1.651 m)    Wt 70.7 kg (155 lb 13.8 oz)    SpO2 97%    Breastfeeding No    BMI 25.94 kg/m2         Physical Exam:  Normal sensory and motor exam  Wound/dressing C/D/I  Afebrile        Assessment/Plan     Principal Problem:    Cervical myelopathy (HCC) (5/9/2018)        Activity as tolerated. Encourage IS.

## 2018-05-12 NOTE — PROGRESS NOTES
[facility]  Hospitalist Division        Inpatient Daily Progress Note    Daily progress Note    Patient: Ari Valencia MRN: [id number]  CSN: [id number]    YOB: 1955  Age: 58 y.o. Sex: female    DOA: 5/9/2018 LOS:  LOS: 3 days                    Chief Complaint:  Cervical Myelopathy      Assessment/Plan:     Patient Active Problem List   Diagnosis Code    Gross hematuria R31.0    Nephrolithiasis N20.0    Ureteral stone N20.1    Gastroesophageal reflux disease K21.9    Chronic viral hepatitis C ([facility] [address] B18.2    Hepatitis C virus infection B19.20    Morbid obesity ([facility] [address] E66.01    Thrombocytopenia ([facility] [address] D69.6    Tobacco dependence syndrome F17.200    Calculus of ureter N20.1    Abdominal pain R10.9    Incisional hernia with obstruction but no gangrene K43.0    Cervical myelopathy (HCC) G95.9       A/P:  Cervical Myelopathy  -  s/p prone/posterior C3-C7 instrumentation, posterior fusion C3-4, C4-5, C5-6 C6-7  - supine/ ANTERIOR c3-4,c4-5,c5-6,c6-7 DISCECTOMY and FUSION with peek arthrodesis of cervical spine, c34, c45, c56 and c67    - Ancef 2 grams TID x 3 doses  - Neuro checks  - pain control, Flexeril  - pulmonary toilet, encourage ICS  - brace, hemovac drain care TID  - ambulate w/ assist, with brace     Hx of HCV w/ cirrhosis  - pt states she was treated in recent past  - f/u with PCP/GI outpatient     Hx of GERD  - continue Prilosec 40 mg daily      Tobacco abuse  - encourage cessation  - Nicotine patch     Hx of depression  - continue Lexapro     DVT Prophylaxis  - SCD's BLE      Subjective:      No events overnight.   No complaints other than expected neck pain and stiffness  Objective:      Visit Vitals    /74 (BP 1 Location: Right arm, BP Patient Position: At rest)    Pulse 60    Temp 97.9 °F (36.6 °C)    Resp 16    Ht 5' 5\" (1.651 m)    Wt 70.7 kg (155 lb 13.8 oz)    SpO2 97%    Breastfeeding No    BMI 25.94 kg/m2 Physical Exam:  General appearance: alert, cooperative, no distress, appears stated age  Head: Neck brace in place, left anterior hemovac posterior hemovac intact  Lungs: clear to auscultation bilaterally  Heart: regular rate and rhythm, S1, S2 normal, no murmur, click, rub or gallop  Abdomen: soft, non tender, non distended. Normoactive bowel sounds. Extremities: extremities normal, atraumatic, no cyanosis or edema  Skin: Scattered bruises BUE  Neurologic: Grossly normal, sensation intact  PSY: Mood and affect normal, appropriately behaved        Intake and Output:  Current Shift:     Last three shifts:  05/10 1901 - 05/12 0700  In: 1370 [P.O.:1360; I.V.:10]  Out: 1275 [Urine:1200; Drains:75]    Recent Results (from the past 24 hour(s))   CBC WITH AUTOMATED DIFF    Collection Time: 05/12/18  4:18 AM   Result Value Ref Range    WBC 8.1 4.6 - 13.2 K/uL    RBC 4.15 (L) 4.20 - 5.30 M/uL    HGB 12.9 12.0 - 16.0 g/dL    HCT 40.3 35.0 - 45.0 %    MCV 97.1 (H) 74.0 - 97.0 FL    MCH 31.1 24.0 - 34.0 PG    MCHC 32.0 31.0 - 37.0 g/dL    RDW 13.3 11.6 - 14.5 %    PLATELET 84 (L) 221 - 420 K/uL    MPV 11.6 9.2 - 11.8 FL    NEUTROPHILS 86 (H) 40 - 73 %    LYMPHOCYTES 8 (L) 21 - 52 %    MONOCYTES 6 3 - 10 %    EOSINOPHILS 0 0 - 5 %    BASOPHILS 0 0 - 2 %    ABS. NEUTROPHILS 7.0 1.8 - 8.0 K/UL    ABS. LYMPHOCYTES 0.7 (L) 0.9 - 3.6 K/UL    ABS. MONOCYTES 0.5 0.05 - 1.2 K/UL    ABS. EOSINOPHILS 0.0 0.0 - 0.4 K/UL    ABS.  BASOPHILS 0.0 0.0 - 0.06 K/UL    DF AUTOMATED     METABOLIC PANEL, BASIC    Collection Time: 05/12/18  4:18 AM   Result Value Ref Range    Sodium 136 136 - 145 mmol/L    Potassium 4.9 3.5 - 5.5 mmol/L    Chloride 96 (L) 100 - 108 mmol/L    CO2 39 (H) 21 - 32 mmol/L    Anion gap 1 (L) 3.0 - 18 mmol/L    Glucose 121 (H) 74 - 99 mg/dL    BUN 12 7.0 - 18 MG/DL    Creatinine 0.59 (L) 0.6 - 1.3 MG/DL    BUN/Creatinine ratio 20 12 - 20      GFR est AA >60 >60 ml/min/1.73m2    GFR est non-AA >60 >60 ml/min/1.73m2    Calcium 8.6 8.5 - 10.1 MG/DL           Lab Results   Component Value Date/Time    Glucose 121 (H) 05/12/2018 04:18 AM    Glucose 134 (H) 05/10/2018 04:20 AM        Imaging:  No results found.     Medication List Reviewed:  Current Facility-Administered Medications   Medication Dose Route Frequency    HYDROmorphone (DILAUDID) injection 1 mg  1 mg IntraVENous Q4H PRN    pantoprazole (PROTONIX) tablet 40 mg  40 mg Oral ACB    methylPREDNISolone (MEDROL DOSEPACK) tablet   Oral 6-DAY DOSEPAK TAPER    escitalopram oxalate (LEXAPRO) tablet 20 mg  20 mg Oral DAILY    sodium chloride (NS) flush 5-10 mL  5-10 mL IntraVENous Q8H    sodium chloride (NS) flush 5-10 mL  5-10 mL IntraVENous PRN    acetaminophen (TYLENOL) tablet 650 mg  650 mg Oral Q4H PRN    oxyCODONE-acetaminophen (PERCOCET) 5-325 mg per tablet 1-2 Tab  1-2 Tab Oral Q4H PRN    naloxone (NARCAN) injection 0.4 mg  0.4 mg IntraVENous PRN    ondansetron (ZOFRAN) injection 4 mg  4 mg IntraVENous Q4H PRN    phenol throat spray (CHLORASEPTIC) 1 Spray  1 Spray Oral PRN    cyclobenzaprine (FLEXERIL) tablet 10 mg  10 mg Oral TID PRN    nicotine (NICODERM CQ) 21 mg/24 hr patch 1 Patch  1 Patch TransDERmal Q24H

## 2018-05-12 NOTE — ROUTINE PROCESS
2086- Bedside and Verbal shift change report received from ANDRE Velásquez RN. Report included the following information SBAR, Kardex and MAR. Pt lying in bed sleeping, does not appear to be experiencing any discomfort at this time. 1013- Pt given PO pain med. 1146- Pt lying in bed sleeping, in no apparent distress at this time. 1352- Pt given PO pain med. 1409- Pt verbalizes some relief from pain. 1519- Pt lying in bed sleeping, pt appears to be experiencing no discomfort at this time. 1626- Pt sleeping, in no apparent distress. 1724- Pt given PO pain med as advised. 1851- Pt sleeping, in no apparent distress or discomfort. 1921- Bedside and Verbal shift change report given to Sanchez Morgan RN (oncoming nurse) by AARON Kingston (offgoing nurse). Report included the following information SBAR, Kardex and MAR.

## 2018-05-12 NOTE — PROGRESS NOTES
[facility] Patient is alert and oriented x 4.    2200 Patient requested new ice water, provided. 0000 Patient resting asleep. 80 Took patient for walk 1200 ' x 8 minutes. Informed that she had been passing gas. Tolerated well Call bell in reach. 0725 Bedside and Verbal shift change report given to Ines Nicole (oncoming nurse) by Teri Cruz RN (offgoing nurse). Report included the following information SBAR, ED Summary, OR Summary, Procedure Summary, Intake/Output, MAR and Recent Results.

## 2018-05-12 NOTE — PROGRESS NOTES
Clinical Pharmacy Note: IV to PO Automatic Conversion    Please note: Mary Beth Mann medication (pantoprazole 40 mg) has been changed from IV to PO based on the following critiera:    - Patient is taking scheduled oral medications  - Patient is tolerating tube feeds at goal rate or a full liquid, soft or regular diet    This IV to PO conversion is based on the [employer] approved automatic conversion policy for eligible patients. This medication is not a current home medication and may consider discontinuation. Please call with questions.     Thanks,  Michele Lomeli, PHARMD

## 2018-05-13 VITALS
RESPIRATION RATE: 15 BRPM | DIASTOLIC BLOOD PRESSURE: 85 MMHG | TEMPERATURE: 97.4 F | BODY MASS INDEX: 25.97 KG/M2 | HEART RATE: 84 BPM | OXYGEN SATURATION: 99 % | HEIGHT: 65 IN | SYSTOLIC BLOOD PRESSURE: 135 MMHG | WEIGHT: 155.87 LBS

## 2018-05-13 PROCEDURE — 74011250637 HC RX REV CODE- 250/637: Performed by: NEUROLOGICAL SURGERY

## 2018-05-13 PROCEDURE — 97116 GAIT TRAINING THERAPY: CPT

## 2018-05-13 RX ADMIN — ESCITALOPRAM OXALATE 20 MG: 10 TABLET ORAL at 08:35

## 2018-05-13 RX ADMIN — PANTOPRAZOLE SODIUM 40 MG: 40 TABLET, DELAYED RELEASE ORAL at 08:35

## 2018-05-13 RX ADMIN — OXYCODONE HYDROCHLORIDE AND ACETAMINOPHEN 2 TABLET: 5; 325 TABLET ORAL at 12:52

## 2018-05-13 RX ADMIN — OXYCODONE HYDROCHLORIDE AND ACETAMINOPHEN 2 TABLET: 5; 325 TABLET ORAL at 07:35

## 2018-05-13 RX ADMIN — Medication 10 ML: at 06:00

## 2018-05-13 RX ADMIN — METHYLPREDNISOLONE 2 MG: 4 TABLET ORAL at 07:16

## 2018-05-13 NOTE — PROGRESS NOTES
[facility]  Hospitalist Division        Inpatient Daily Progress Note    Daily progress Note    Patient: Ari Valencia MRN: [id number]  CSN: [id number]    YOB: 1955  Age: 58 y.o. Sex: female    DOA: 5/9/2018 LOS:  LOS: 4 days                    Chief Complaint:  Cervical Myelopathy      Assessment/Plan:     Patient Active Problem List   Diagnosis Code    Gross hematuria R31.0    Nephrolithiasis N20.0    Ureteral stone N20.1    Gastroesophageal reflux disease K21.9    Chronic viral hepatitis C ([facility] [address] B18.2    Hepatitis C virus infection B19.20    Morbid obesity ([facility] [address] E66.01    Thrombocytopenia ([facility] [address] D69.6    Tobacco dependence syndrome F17.200    Calculus of ureter N20.1    Abdominal pain R10.9    Incisional hernia with obstruction but no gangrene K43.0    Cervical myelopathy (HCC) G95.9       A/P:  Ok for dc per medicine perspective    Subjective:      No events overnight. Feeling better  Objective:      Visit Vitals    BP (!) 156/91 (BP 1 Location: Right arm, BP Patient Position: At rest)    Pulse 73    Temp 98.3 °F (36.8 °C)    Resp 15    Ht 5' 5\" (1.651 m)    Wt 70.7 kg (155 lb 13.8 oz)    SpO2 93%    Breastfeeding No    BMI 25.94 kg/m2           Physical Exam:  General appearance: alert, cooperative, no distress, appears stated age  Head: Neck brace in place, left anterior hemovac posterior hemovac intact  Lungs: clear to auscultation bilaterally  Heart: regular rate and rhythm, S1, S2 normal, no murmur, click, rub or gallop  Abdomen: soft, non tender, non distended. Normoactive bowel sounds.    Extremities: extremities normal, atraumatic, no cyanosis or edema  Skin: Scattered bruises BUE  Neurologic: Grossly normal, sensation intact  PSY: Mood and affect normal, appropriately behaved        Intake and Output:  Current Shift:     Last three shifts:  05/11 1901 - 05/13 0700  In: 435 [P.O.:435]  Out: -     No results found for this or any previous visit (from the past 24 hour(s)). Lab Results   Component Value Date/Time    Glucose 121 (H) 05/12/2018 04:18 AM    Glucose 134 (H) 05/10/2018 04:20 AM        Imaging:  No results found.     Medication List Reviewed:  Current Facility-Administered Medications   Medication Dose Route Frequency    HYDROmorphone (DILAUDID) injection 1 mg  1 mg IntraVENous Q4H PRN    pantoprazole (PROTONIX) tablet 40 mg  40 mg Oral ACB    methylPREDNISolone (MEDROL DOSEPACK) tablet   Oral 6-DAY DOSEPAK TAPER    escitalopram oxalate (LEXAPRO) tablet 20 mg  20 mg Oral DAILY    sodium chloride (NS) flush 5-10 mL  5-10 mL IntraVENous Q8H    sodium chloride (NS) flush 5-10 mL  5-10 mL IntraVENous PRN    acetaminophen (TYLENOL) tablet 650 mg  650 mg Oral Q4H PRN    oxyCODONE-acetaminophen (PERCOCET) 5-325 mg per tablet 1-2 Tab  1-2 Tab Oral Q4H PRN    naloxone (NARCAN) injection 0.4 mg  0.4 mg IntraVENous PRN    ondansetron (ZOFRAN) injection 4 mg  4 mg IntraVENous Q4H PRN    phenol throat spray (CHLORASEPTIC) 1 Spray  1 Spray Oral PRN    cyclobenzaprine (FLEXERIL) tablet 10 mg  10 mg Oral TID PRN    nicotine (NICODERM CQ) 21 mg/24 hr patch 1 Patch  1 Patch TransDERmal Q24H

## 2018-05-13 NOTE — DISCHARGE SUMMARY
[address]  MR#: [id number]  : 1955  ACCOUNT #: [de-identified]   ADMIT DATE: 2018  DISCHARGE DATE:     ADMISSION DIAGNOSIS:  Cervical stenosis with radiculopathy. OPERATIONS PERFORMED:  Anterior cervical diskectomy and plating C3-4, C4-5, C5-6, and posterior decompression C3-7. HISTORY:  This is a patient of Dr. Leonel Saenz, cervical radiculopathy, stenosis, admitted for surgery. HOSPITAL COURSE:  The patient was admitted, taken to surgery, procedure was performed without complication. She has done well postoperatively. Pain is controlled. She is being discharged with Percocet for pain, Flexeril for spasm. She will be seen back by Dr. Leonel Saenz in 10-14 days. Instructions have been given. CONDITION AT TIME OF DISCHARGE:  Good. DISPOSITION:  Home with office followup in 2 weeks.       MD JOESPH Lackey/ALLA  D: 2018 05:43     T: 2018 06:43  JOB #: [id number]

## 2018-05-13 NOTE — PROGRESS NOTES
Problem: Mobility Impaired (Adult and Pediatric)  Goal: *Acute Goals and Plan of Care (Insert Text)  Physical Therapy Goals  Initiated 5/10/2018 and to be accomplished within 7 day(s)  1. Patient will move from supine to sit and sit to supine  in bed with modified independence. 2.  Patient will perform sit to stand with modified independence. 3.  Patient will transfer from bed to chair and chair to bed with modified independence using the least restrictive device. 4.  Patient will ambulate with modified independence for 150 feet with the least restrictive device. 5.  Patient will ascend/descend 2 stairs with handrail(s) with modified independence. 6.  Patient will negotiate obstacles during ambulation with modified independence. 7.  Patient will verbalize cervical precautions precautions. 8.  Patient will demonstrate compliance with cervical precautions greater than 90% of session. 9.  Patient will demonstrate appropriate use of incentive spirometer to aid in pulomnary compliance for mobility. Outcome: Progressing Towards Goal  PHYSICAL THERAPY: Daily TREATMENT Note   INPATIENT: Commercial: Hospital Day: 5     Patient: Clau Spencer (14 y.o. female)    Date: 5/13/2018  Primary Diagnosis: cervical stenosis/instabiliey  Cervical myelopathy (HCC)   Procedure(s) (LRB):  supine/ ANTERIOR c3-4,c4-5,c5-6,c6-7 DISCECTOMY and FUSION with peek (N/A)  prone/posterior c3-c7 instrumentation (N/A), 4 Days Post-Op,   Precautions: Fall, Spinal, Skin      Chart, physical therapy assessment, plan of care and goals were reviewed. PLOF:Independent     ASSESSMENT:  Pt progressing well. Mod I for bed mobility, and for sit <>std. SBA amb for amb with RW. VCs to increase foot clearance. SBA> CGA for stair neg.with reciprocal with 1 HR safely. Pt left in chair with call bell in reach.     Progression toward goals:        Improving appropriately and progressing toward goals(X)        Improving slowly and progressing toward goals        Not making progress toward goals and plan of care will be adjusted     PLAN:  Patient continues to benefit from skilled intervention to address the above impairments. Continue treatment per established plan of care. EDUCATION:   Education:  Patient was educated on the following topics: Pt ed on importance + benefits to perform bed mobility and exercises every 1-2 hours to increase/maintain LE/core strength to promote functional mobility, pt verbalizes understanding. Barriers to Learning/Limitations: None  Compensate with: visual, verbal, tactile, kinesthetic cues/model    Discharge Recommendations:  Home Health  Further Equipment Recommendations for Discharge:  rolling walker  Factors which may impact discharge planning: pain control      SUBJECTIVE:   Patient stated I am ready to walk, I am in not too pain.     OBJECTIVE DATA SUMMARY:   Critical Behavior:  Neurologic State: Alert  Orientation Level: Oriented X4  Cognition: Appropriate decision making  Safety/Judgement: Fall prevention      [employer] Balance Scale  0: Pt performs 25% or less of standing activity (Max assist) CN, 100% impaired. 1: Pt supports self with upper extremities but requires therapist assistance. Pt performs 25-50% of effort (Mod assist) CM, 80% to <100% impaired. 1+: Pt supports self with upper extremities but requires therapist assistance. Pt performs >50% effort. (Min assist). CL, 60% to <80% impaired. 2: Pt supports self independently with both upper extremities (walker, crutches, parallel bars). CL, 60% to <80% impaired. 2+: Pt support self independently with 1 upper extremity (cane, crutch, 1 parallel bar). CK, 40% to <60% impaired. 3: Pt stands without upper extremity support for up to 30 seconds. CK, 40% to <60% impaired. 3+: Pt stands without upper extremity support for 30 seconds or greater. CJ, 20% to <40% impaired.   4: Pt independently moves and returns center of gravity 1-2 inches in one plane. CJ, 20% to <40% impaired. 4+: Pt independently moves and returns center of gravity 1-2 inches in multiple planes. CI, 1% to <20% impaired. 5: Pt independently moves and returns center of gravity in all planes greater than 2 inches. CH, 0% impaired.       Functional Mobility:      Functional Status      Indep   (I)   Mod I   Super-vision   Min A   Mod A   Max A   Total A   Assist x2 Verbal cues Additional time Not tested   Comments   Rolling []  [x]  [] []    []    []  []  [] [] [] []    Supine to sit []  [x]  [] []  []  []  []  [] [] [] []    Sit to supine []  [x]  [] []  []  []  []  [] [] [] []    Sit to stand []  [x]  [] []  []  []  []  [] [] [] []    Stand to sit []  [x]  [] []  []  []  []  [] [] [] []    Bed to chair transfers []  []  [] []  []  []  []  [] [] [] []        Balance    Good   Fair   Poor   Unable   Not tested   Comments   Sitting static []  []  []  []  []    Sitting dynamic []  []  []  []  []    Standing static []  []  []  []  []    Standing dynamic []  []  []  []  []      Mobility/Gait:   Level of Assistance: Stand-by assistance  Assistive Device: rolling walker  Distance Ambulated: 70 feet  x ; 65' x1   Base of Support: center of gravity altered  Speed/Justyna: slow  Step Length: left shortened and right shortened  Swing Pattern: left asymmetrical and right asymmetrical  Stance: left decreased and right decreased  Gait Abnormalities: antalgic and decreased step clearance    Stairs:   Level of Assistance: Stand-by assistance   Rail Use:Left HR ascending, Right HR descending  Number of Stairs: 3      Vital Signs  Temp: 98.3 °F (36.8 °C)     Pulse (Heart Rate): 73     BP: (!) 156/91     Resp Rate: 15     O2 Sat (%): 93 %  Pain:4/10  Pre treatment pain level:4/10  Post treatment pain level:4/10  Pain Scale 1: Numeric (0 - 10)  Pain Intensity 1: 6  Pain Location 1: Neck     Pain Description 1: Aching  Pain Intervention(s) 1: Medication (see MAR)  Activity Tolerance: good  After treatment:   Patient left in no apparent distress sitting up in chair(X)  Patient left in no apparent distress in bed  Call bell left within reach(X)  Nursing notified(X)  Caregiver present  Bed alarm activated      Maki Bess PTA   Time Calculation: 16 mins

## 2018-05-13 NOTE — PROGRESS NOTES
0800  Received pt walking in the room, speech is clear, no wheezing,able to swallow her saliva, incision both in her neck and one in the back is clean no redness and no swelling noted at this time, on Aspiration precaution, aware to  Take anything slowly, on and off productive cough noted. 1000  Up  Walking in the room, no pain for discharge today, awaiting her ride at 1300.    1200  Up  Walking in the hallway with P.T    1245  Medicated for pain prior to discharge home, discharge instructions given, speech is clear, on and off coughing , lungs clear nom stridor and no wjhezzing. 3 incisional site all dry and intact no redness and no swelling. Instructed to wear cervical collar at all times. . Verbalized understanding of all instruction. rermove  Needle, IV site no redness and no swelling.

## 2018-05-13 NOTE — DISCHARGE INSTRUCTIONS
[facility], [address] [place] [address], [address]  [address]  Phone:     [phone]  Fax:         [phone]  Mamadou Daniel. MD Dexter Shukla MD Ival Redbird, MD Parker Davidson. Nile Fus, MD Monda Hatchet, MD Jerri Hickory, MD Cosme Cora PA-C    Discharge Instructions  With your recent surgery it is not unusual to experience some pain or discomfort in the  area of previous pain or the incision. Accordingly, you have been given pain medication for your comfort until the healing process is further along. As time progresses, you should notice the frequency and the intensity of your discomfort steadily decrease. Here are some simple post operative instructions to help during your home convalescence. 1. Use your pain medication as directed. Refills should be requested during regular office hours and not on weekends. 2. Continue any regular medicine for other conditions (e.g. blood pressure, diabetes, heart problems, etc.). 3. No driving or riding in a car for four (4) weeks except for emergencies or doctors appointments. 4. No bending, lifting greater than 5 lbs, or strenuous activities. If you need to get to the floor, squat instead of bend. 5. No tub baths. Showers are fine after surgery, but make sure to towel dry the incision. A chair may be used in the shower so that you can sit if needed. 6. Avoid exercises except for walking. Begin with frequent, but short, periods of walking and gradually build up to longer periods of time. 7. Sit for short periods of time (10-15 minutes) in the first week after surgery. 8. You may resume sexual relations as comfort level allows. 9. If a cervical collar has been prescribed, wear the cervical collar at all times except when showering. 10. If a lumbar brace has been prescribed wear it when up walking greater than 15 minutes.    No need to wear while sitting. 11. Notify us if you develop fever, painful redness around the incision or drainage from the wound. 12. Call and schedule you follow-up appointment with your doctor @ [phone]. If you have any other questions, please contact our office at [phone]. Thank you!

## 2018-05-13 NOTE — PROGRESS NOTES
Progress Note      Patient: Viv Gilbert               Sex: female          DOA: 5/9/2018         YOB: 1955      Age:  58 y.o.         LOS: 4 days               Subjective:     Patient seen and evaluated by Dr Anne Marie Melton this am.  Doing well. No new complaints  C/O incisional pain    Objective:      Visit Vitals    /79 (BP 1 Location: Right arm)    Pulse 60    Temp 98.2 °F (36.8 °C)    Resp 15    Ht 5' 5\" (1.651 m)    Wt 70.7 kg (155 lb 13.8 oz)    SpO2 95%    Breastfeeding No    BMI 25.94 kg/m2         Physical Exam:  Normal sensory and motor exam  Wound/dressing C/D/I  Afebrile        Assessment/Plan     Principal Problem:    Cervical myelopathy (HCC) (5/9/2018)        Discharge home today  Percocet for pain. Flexeril for spasm  Instructions given.   Follow up with Dr Anne Marie Melton in 2 weeks [phone]

## 2018-05-29 NOTE — ANCILLARY DISCHARGE INSTRUCTIONS
[employer]  Discharge Phone Call       After-Care Discharge Phone Call Questions: no answer     Were you able to get your prescriptions filled? Comment:      [] Yes  []No    Comment if answer is \"No\"   Are you taking your medication(s) as your doctor ordered? Do you understand the purpose of your medications? Comment:    [] Yes  []No    Comment if answer is \"No\"   Are you taking any other medications that are not on the list?  Comment:      [] Yes  []No    Comment if answer is \"Yes\"   Do you have any questions about your medications? Are you aware of potential side effects? Comment:    [] Yes  []No    Comment if answer is \"Yes\"   Did you make your follow-up appointments (if the hospital did not do this before  discharge)? Comment:    [] Yes  []No    Comment if answer is \"No\"   Is there any reason you might not be able to keep your follow-up appointments? Comment:     [] Yes  []No    Comment if answer is \"Yes\"   Do you have any questions about your care plan? Are you aware of what health problems to be alert for? Comment:    [] Yes  []No    Comment if answer is \"Yes\"   Do you have a good understanding of how you should manage your health? Comment:    [] Yes  []No    Comment if answer is \"Yes\"   Do you know which symptoms to watch for that would mean you would need to call your doctor right away? Comment:      [] Yes  []No    Comment if answer is \"No\"   Do you have any questions about the follow up process or any instructions that we have provided? Comment:    [] Yes  []No    Comment if answer is \"Yes\"   Did staff take your preferences into account?         [] Yes  []No    Comment if answer is \"Yes\"

## 2021-01-12 PROBLEM — J43.2 CENTRILOBULAR EMPHYSEMA (HCC): Status: ACTIVE | Noted: 2020-03-10

## 2021-01-12 PROBLEM — F39 EPISODIC MOOD DISORDER (HCC): Status: ACTIVE | Noted: 2020-03-10

## 2022-03-19 PROBLEM — G95.9 CERVICAL MYELOPATHY (HCC): Status: ACTIVE | Noted: 2018-05-09

## 2022-03-19 PROBLEM — F39 EPISODIC MOOD DISORDER (HCC): Status: ACTIVE | Noted: 2020-03-10

## 2022-03-19 PROBLEM — J43.2 CENTRILOBULAR EMPHYSEMA (HCC): Status: ACTIVE | Noted: 2020-03-10

## 2022-09-28 ENCOUNTER — OFFICE VISIT (OUTPATIENT)
Dept: HEMATOLOGY | Age: 67
End: 2022-09-28
Payer: MEDICARE

## 2022-09-28 VITALS
DIASTOLIC BLOOD PRESSURE: 56 MMHG | BODY MASS INDEX: 25.73 KG/M2 | HEART RATE: 70 BPM | SYSTOLIC BLOOD PRESSURE: 93 MMHG | TEMPERATURE: 97.1 F | OXYGEN SATURATION: 98 % | WEIGHT: 160.13 LBS | HEIGHT: 66 IN

## 2022-09-28 DIAGNOSIS — C22.0 HEPATOCELLULAR CARCINOMA (HCC): Primary | ICD-10-CM

## 2022-09-28 PROCEDURE — 1101F PT FALLS ASSESS-DOCD LE1/YR: CPT | Performed by: INTERNAL MEDICINE

## 2022-09-28 PROCEDURE — G8400 PT W/DXA NO RESULTS DOC: HCPCS | Performed by: INTERNAL MEDICINE

## 2022-09-28 PROCEDURE — G8417 CALC BMI ABV UP PARAM F/U: HCPCS | Performed by: INTERNAL MEDICINE

## 2022-09-28 PROCEDURE — 1090F PRES/ABSN URINE INCON ASSESS: CPT | Performed by: INTERNAL MEDICINE

## 2022-09-28 PROCEDURE — G8510 SCR DEP NEG, NO PLAN REQD: HCPCS | Performed by: INTERNAL MEDICINE

## 2022-09-28 PROCEDURE — 1123F ACP DISCUSS/DSCN MKR DOCD: CPT | Performed by: INTERNAL MEDICINE

## 2022-09-28 PROCEDURE — G9711 PT HX TOT COL OR COLON CA: HCPCS | Performed by: INTERNAL MEDICINE

## 2022-09-28 PROCEDURE — 99205 OFFICE O/P NEW HI 60 MIN: CPT | Performed by: INTERNAL MEDICINE

## 2022-09-28 PROCEDURE — G8536 NO DOC ELDER MAL SCRN: HCPCS | Performed by: INTERNAL MEDICINE

## 2022-09-28 PROCEDURE — G8428 CUR MEDS NOT DOCUMENT: HCPCS | Performed by: INTERNAL MEDICINE

## 2022-09-28 RX ORDER — FUROSEMIDE 20 MG/1
TABLET ORAL DAILY
COMMUNITY
End: 2022-09-28

## 2022-09-28 RX ORDER — FLUTICASONE FUROATE AND VILANTEROL 100; 25 UG/1; UG/1
1 POWDER RESPIRATORY (INHALATION) DAILY
COMMUNITY

## 2022-09-28 RX ORDER — FUROSEMIDE 40 MG/1
40 TABLET ORAL DAILY
Qty: 90 TABLET | Refills: 3 | Status: SHIPPED | OUTPATIENT
Start: 2022-09-28

## 2022-09-28 RX ORDER — LACTULOSE 10 G/15ML
SOLUTION ORAL; RECTAL 3 TIMES DAILY
COMMUNITY

## 2022-09-28 RX ORDER — SPIRONOLACTONE 100 MG/1
100 TABLET, FILM COATED ORAL DAILY
Qty: 90 TABLET | Refills: 3 | Status: SHIPPED | OUTPATIENT
Start: 2022-09-28

## 2022-09-28 NOTE — PROGRESS NOTES
[facility], MD, FACP, [facility], [place]      Vito De Luna, PA-ANNA    April S Mayur, AGPCNP-BC   Thomasrajeshdevyn Brown, ACNPC-AG   Britni Scott, FNP-C   Mir Carney, FNP-C    Vernell Side, AGPCNP-BC       Roni Deputado [address]    at [address], [address]    [phone]    FAX: [address]    [address], [address] [place] [address], [address]    [phone]    FAX: [phone]       Patient Care Team:  None as PCP - General  Paz Bond MD (Gastroenterology)  Aguilar Dickerson MD (Hematology and Oncology)      Problem List  Date Reviewed: 10/22/2022            Codes Class Noted    History of cholecystectomy ICD-10-CM: Z90.49  ICD-9-CM: V45.79  10/22/2022        Hepatocellular carcinoma ([facility] ICD-10-CM: C22.0  ICD-9-CM: 155.0  10/22/2022        Centrilobular emphysema ([facility] ICD-10-CM: J43.2  ICD-9-CM: 492.8  3/10/2020        Episodic mood disorder ([facility] ICD-10-CM: F39  ICD-9-CM: 296.90  3/10/2020        Cervical myelopathy ([facility] ICD-10-CM: G95.9  ICD-9-CM: 721.1  5/9/2018        Abdominal pain ICD-10-CM: R10.9  ICD-9-CM: 789.00  8/16/2016        Incisional hernia with obstruction but no gangrene ICD-10-CM: K43.0  ICD-9-CM: 552.21  2/1/2016        Thrombocytopenia ([facility] ICD-10-CM: D69.6  ICD-9-CM: 287.5  9/4/2015        Ureteral stone ICD-10-CM: N20.1  ICD-9-CM: 592.1  7/31/2015        Calculus of ureter ICD-10-CM: N20.1  ICD-9-CM: 592.1  7/24/2015        Chronic hepatitis C ([facility] ICD-10-CM: B18.2  ICD-9-CM: 070.54  4/28/2015        Cirrhosis ([facility] ICD-10-CM: K74.60  ICD-9-CM: 571.5  4/28/2015        Nephrolithiasis ICD-10-CM: N20.0  ICD-9-CM: 592.0  3/13/2014        Gross hematuria ICD-10-CM: R31.0  ICD-9-CM: 599.71  5/9/2013 Gastroesophageal reflux disease ICD-10-CM: K21.9  ICD-9-CM: 530.81  9/17/2009        Morbid obesity ([facility] ICD-10-CM: E66.01  ICD-9-CM: 278.01  9/17/2009        Tobacco dependence syndrome ICD-10-CM: F17.200  ICD-9-CM: 305.1  9/17/2009           The clinicians listed above have asked me to see Derek Garvey in consultation regarding chronic HCV, cirrhosis and hepatocellular carcinoma. All medical records sent by the referring physicians were reviewed including imaging studies and pathology. The patient is a 79 y.o.  female who was found to have abnormalities in liver chemistries and subsequently tested positive for chronic HCV in 2013. She was treated with Epclusa in 2013 and apparently achieved SVR/cure. The last HCV RNA was undetectable in 6/2016      Imaging of the liver was performed at regular intervals. MRI in 6/2022 demonstrated multiple enhancing liver masses and PV thrombosis suggesting multifocal HCC. A biopsy of the liver masses demonstrated adenoCA consistent with HCC. A Chest CT in 8/2022 demonstrated a left upper lobe lung mass. Biopsy was positive for adenoCA most likely metastatic disease from the [facility] She has been seen at [place] [address] and is apparently starting immune therapy for metastatic [facility] The patient has developed the following complications of cirrhosis: esophageal varices, ascites, edema, hepatocellular carcinoma. Ascites is now refractory to diuretics and she is getting paracentesis every 2 weeks    The patient has the following symptoms which could be attributed to the liver disorder:    fatigue,   swelling of the abdomen, which has required paracentesis  swelling of the lower extremities,   shortness of breath,   chest pain,     The patient is not experiencing the following symptoms which are commonly seen in this liver disorder:   fevers,   chills,   problems concentrating,   hematemesis,   hematochezia.     The patient has Severe limitations in functional activities which can be attributed to the liver disease and to other medical problems that are not related to the liver disease. ASSESSMENT AND PLAN:  Chronic HCV Treatment  The patient has been treated for HCV with Epclusa (sofosbuvir and velpatasvir)   The patient has achieved sustained virologic response/cure. The last HCV RNA was undetectable in 6/2016. No further testing for HCV is necessary. Cirrhosis  The diagnosis of cirrhosis is based upon imaging, laboratory studies, complications of cirrhosis. Cirrhosis is secondary to chronic HCV. The CTP is 7. Child class B. The MELD score is 9. Ascites   Ascites developed for the first time in 9/2022. Ascites has a high percentage of mononuclear cells and may be due to [address] and not cirrhosis. I do not see ascites albumin to calculate SAAG. The current dose of diuretics is only lasix 20 mg every day. I have increased this to step 1. The dose can be increased stepwise as tolerated by Sca and Sna    Paracentesis should be performed as needed. Lower extremity edema  Edema is severe 4+  The current dose of diuretics is only lasix 20 mg every day. I have increased this to step 1. The dose can be increased stepwise as tolerated by Sca and Sna    Hepatocellular carcinoma   The diagnosis was made in 6/2022 by dynamic MRI showing LIRADS-5 with multiple liver masses and evidence of infiltrating disease with portal vein invasion. A biopsy in 8/2022 was positive for [address] At diagnosis the [address] was diffuse infiltrating masses located in the throughout the liver   A chest CT demonstrated a left upper lobe lung mass. Biopsy was positive for adenoCA  This was stage T4B with metastases outside the liver. The patient is being seen at [facility] and will be starting immune therapy. This would be the primary treatment for metastatic [address] and I agree with this plan.   There is no role for surgery or embolic therapy utilizing TACE or Y-90 in this setting. The patient cannot be cured of [address] The best hope would be control of the [address] with immune therapy. However with tumor burden so large immune therapy might not be very effective. The patient has a limited prognosis and any treatment should be considered palliative. Estimate life expectancy to be 6-12 months, on the higher side if responds to immune therapy. Treatment of other medical problems in patients with chronic liver disease  There are no contraindications for the patient to take most medications that are necessary for treatment of other medical issues. Counseling for alcohol in patients with chronic liver disease  The patient was counseled regarding alcohol consumption and the effect of alcohol on chronic liver disease. The patient does not consume any significant amount of alcohol. ALLERGIES  Allergies   Allergen Reactions    Aspirin Other (comments)     Vomiting, bruising,        MEDICATIONS  Current Outpatient Medications   Medication Sig    lactulose (CHRONULAC) 10 gram/15 mL solution Take  by mouth three (3) times daily. fluticasone furoate-vilanteroL (Breo Ellipta) 100-25 mcg/dose inhaler Take 1 Puff by inhalation daily. tiotropium (Spiriva with HandiHaler) 18 mcg inhalation capsule Take 1 Capsule by inhalation daily. spironolactone (Aldactone) 100 mg tablet Take 1 Tablet by mouth daily. furosemide (Lasix) 40 mg tablet Take 1 Tablet by mouth daily. Indications: Ascites    albuterol (PROVENTIL HFA, VENTOLIN HFA, PROAIR HFA) 90 mcg/actuation inhaler Take 2 Puffs by inhalation. No current facility-administered medications for this visit. SYSTEM REVIEW NOT RELATED TO LIVER DISEASE OR REVIEWED ABOVE:  Constitution systems: Negative for fever, chills, weight gain, weight loss. Eyes: Negative for visual changes. ENT: Negative for sore throat, painful swallowing. Respiratory: Negative for cough, hemoptysis, SOB.    Cardiology: Negative for chest pain, palpitations. GI:  Negative for constipation or diarrhea. : Negative for urinary frequency, dysuria, hematuria, nocturia. Skin: Negative for rash. Hematology: Negative for easy bruising, blood clots. Musculo-skelatal: Negative for back pain, muscle pain, weakness. Neurologic: Negative for headaches, dizziness, vertigo, memory problems not related to HE. Psychology: Negative for anxiety, depression. FAMILY HISTORY:  The father  of unknown cause. The mother  of cancer. There is no family history of liver disease. SOCIAL HISTORY:  The patient is . The patient has 5 children, and 1 grandchildren. The patient stopped using tobacco products in 2022. The patient has never consumed significant amounts of alcohol. The patient used to work for [employer]. PHYSICAL EXAMINATION:  Visit Vitals  BP (!) 93/56   Pulse 70   Temp 97.1 °F (36.2 °C) (Tympanic)   Ht 5' 6\" (1.676 m)   Wt 160 lb 2 oz (72.6 kg)   SpO2 98%   BMI 25.84 kg/m²     General: In wheelchair. Ill appearing. Eyes: Sclera anicteric. ENT: No oral lesions. Thyroid normal.  Nodes: No adenopathy. Skin: No spider angiomata. No jaundice. No palmar erythema. Respiratory: Lungs clear to auscultation. Cardiovascular: Regular heart rate. No murmurs. No JVD. Abdomen: Distended with obvious ascites. Extremities: 4+ lower edema. Neurologic: Alert and oriented. Cranial nerves grossly intact. No asterixis. LABORATORY STUDIES:  From 2022  AST/ALT/ALP/T Bili/ALB:  19/40/125/1.2/3.0  WBC/HB/PLT/INR:  9.4/13.6/108/1.1  NA/BUN/CREAT:  139/16/0.7    SEROLOGIES:  2016. HCV RNA undetectable    LIVER HISTOLOGY:  2022. CT guided BX of liver masses. AdenoCA    ENDOSCOPIC PROCEDURES:  2022. Colonoscopy by [facility]. Colon polyp. 2022. EGD by [facility]. Small esophageal varices. No banding    RADIOLOGY:  2021. Dynamic MRI liver. Changes consistent with cirrhosis.   2 small lesions measuring 1.1 and 1.2 cm in left lobe. LIRADS- 2 or 3.  2/2022. CT scan abdomen. Cirrhosis. 2 enhancing liver masses in left lobe slightly larger. 1.8 and 1.3 cm  6/2022. Dynamic MRI. Cirrhosis. Multiple enhancing liver lesions consistent with multifocal HCC. PV thrombosis. 8/2022. CT scan abdomen. .  Changes to liver consistent with cirrhosis. Liver masses. PV thrombosis. 9/2022. Chest CT. Stable left upper lobe lung mass. Previous biopsy adenoCA. OTHER TESTING:  Not available or performed    FOLLOW-UP:  FOLLOW-UP:  All of the issues listed above in the Assessment and Plan were discussed with the patient. All questions were answered. The patient expressed a clear understanding of the above. No follow-up at [address] is needed. I would be glad to see the patient back for follow-up at any time in the future if the clinical situation changes.       Lorene Hanson MD  [address]  [address]  [address]  [address].  [phone]  FAX:  [phone]

## 2022-10-22 PROBLEM — C22.0 HEPATOCELLULAR CARCINOMA (HCC): Status: ACTIVE | Noted: 2022-10-22

## 2022-10-22 PROBLEM — Z90.49 HISTORY OF CHOLECYSTECTOMY: Status: ACTIVE | Noted: 2022-10-22

## (undated) DEVICE — SSC BONE WAX: Brand: SSC BONE WAX

## (undated) DEVICE — 3M™ WARMING BLANKET, LOWER BODY, 10 PER CASE, 42568: Brand: BAIR HUGGER™

## (undated) DEVICE — 3M™ IOBAN™ 2 ANTIMICROBIAL INCISE DRAPE 6640EZ: Brand: IOBAN™ 2

## (undated) DEVICE — 1010 S-DRAPE TOWEL DRAPE 10/BX: Brand: STERI-DRAPE™

## (undated) DEVICE — SPONGE SURG WHT KTNR DISECT RADPQ ST

## (undated) DEVICE — INTENDED FOR TISSUE SEPARATION, AND OTHER PROCEDURES THAT REQUIRE A SHARP SURGICAL BLADE TO PUNCTURE OR CUT.: Brand: BARD-PARKER ® CARBON RIB-BACK BLADES

## (undated) DEVICE — SUTURE PERMAHAND SZ 2-0 L12X18IN NONABSORBABLE BLK SILK A185H

## (undated) DEVICE — TOOL 14MH30 LEGEND 14CM 3MM: Brand: MIDAS REX ™

## (undated) DEVICE — CASPAR DISTR PIN12MMSTER: Brand: AESCULAP

## (undated) DEVICE — STERILE POLYISOPRENE POWDER-FREE SURGICAL GLOVES: Brand: PROTEXIS

## (undated) DEVICE — KENDALL SCD EXPRESS SLEEVES, KNEE LENGTH, MEDIUM: Brand: KENDALL SCD

## (undated) DEVICE — SNAP KOVER: Brand: UNBRANDED

## (undated) DEVICE — STERILE LATEX POWDER-FREE SURGICAL GLOVESWITH NITRILE COATING: Brand: PROTEXIS

## (undated) DEVICE — DRAPE,THYROID,SOFT,STERILE: Brand: MEDLINE

## (undated) DEVICE — TOOL 14CY50 LEGEND 14CM 5MM CY: Brand: MIDAS REX ™

## (undated) DEVICE — INSULATED BLADE ELECTRODE: Brand: EDGE

## (undated) DEVICE — 3M™ IOBAN™ 2 ANTIMICROBIAL INCISE DRAPE 6650EZ: Brand: IOBAN™ 2

## (undated) DEVICE — SUTURE VCRL SZ 3-0 L18IN ABSRB UD L26MM SH 1/2 CIR J864D

## (undated) DEVICE — SOLUTION IRRIG 1000ML H2O STRL BLT

## (undated) DEVICE — 12FR FRAZIER SUCTION HANDLE: Brand: CARDINAL HEALTH

## (undated) DEVICE — MAYFIELD® DISPOSABLE ADULT SKULL PIN (PLASTIC BASE): Brand: MAYFIELD®

## (undated) DEVICE — 10FR FRAZIER SUCTION HANDLE: Brand: CARDINAL HEALTH

## (undated) DEVICE — SUTURE VCRL SZ 0 L18IN ABSRB UD L36MM CT-1 1/2 CIR J840D

## (undated) DEVICE — SPONGE HEMOSTAT CELLULS 4X8IN -- SURGICEL

## (undated) DEVICE — SUTURE VCRL SZ 2-0 L18IN ABSRB UD CT-1 L36MM 1/2 CIR J839D

## (undated) DEVICE — BIPOLAR FORCEPS CORD,BANANA LEADS: Brand: VALLEYLAB

## (undated) DEVICE — CATHETER IV 14GA L1.25IN ORNG POLY SFTY SYS FULL ENCASED

## (undated) DEVICE — SYRINGE MED 20ML STD CLR PLAS LUERLOCK TIP N CTRL DISP

## (undated) DEVICE — NDL SPNE QNCKE 18GX3.5IN LF --

## (undated) DEVICE — Device

## (undated) DEVICE — SYR 10ML LUER LOK 1/5ML GRAD --

## (undated) DEVICE — TOWEL SURG W16XL26IN BLU NONFENESTRATED DLX ST 2 PER PK

## (undated) DEVICE — PACKING 8004000 NEURAY 200PK 13X13MM: Brand: NEURAY ®

## (undated) DEVICE — NDL PRT INJ NSAF BLNT 18GX1.5 --

## (undated) DEVICE — SYR LR LCK 1ML GRAD NSAF 30ML --

## (undated) DEVICE — SUTURE MCRYL SZ 4-0 L18IN ABSRB UD L19MM PS-2 3/8 CIR PRIM Y496G

## (undated) DEVICE — CATHETER IV 10GA L3IN OD3.3-3.5MM ID2.64-2.74MM BRN FEP

## (undated) DEVICE — THYROID SHEET: Brand: CONVERTORS

## (undated) DEVICE — SKIN CLOS DERMABND PRINEO 60CM -- DERMABOUND PRINEO

## (undated) DEVICE — COVER LT HNDL BLU PLAS

## (undated) DEVICE — 20 ML SYRINGE REGULAR TIP: Brand: MONOJECT

## (undated) DEVICE — PREP SKN DURAPREP 26ML APPL --

## (undated) DEVICE — BIPOLAR FORCEPS CORD: Brand: VALLEYLAB

## (undated) DEVICE — CATHETER DRNGE 30FR 4 WNG DISP FOR NEPHSTMY MALECOTS

## (undated) DEVICE — KIT CATH OD16FR 5ML BLLN SIL URIN INDWL STR TIP INF CTRL

## (undated) DEVICE — 3M™ STERI-STRIP™ REINFORCED ADHESIVE SKIN CLOSURES, R1548, 1 IN X 5 IN (25 MM X 125 MM), 4 STRIPS/ENVELOPE: Brand: 3M™ STERI-STRIP™

## (undated) DEVICE — 8FR FRAZIER SUCTION HANDLE: Brand: CARDINAL HEALTH

## (undated) DEVICE — SPINE PACK DEPAUL: Brand: MEDLINE INDUSTRIES, INC.

## (undated) DEVICE — FLOSEAL HEMOSTATIC MATRIX, 10 ML: Brand: FLOSEAL

## (undated) DEVICE — SOLUTION IV 1000ML 0.9% SOD CHL

## (undated) DEVICE — SPONGE: SPECIALTY K-DISS XR  100/CS: Brand: MEDICAL ACTION INDUSTRIES

## (undated) DEVICE — CATHETER IV 18GA L1.25IN GRN FEP SFTY STR HUB RADPQ DISP

## (undated) DEVICE — CODMAN® SURGICAL PATTIES 1/2" X 1/2" (1.27CM X 1.27CM): Brand: CODMAN®

## (undated) DEVICE — FLEX ADVANTAGE 1500CC: Brand: FLEX ADVANTAGE

## (undated) DEVICE — DRAIN KT WND 10FR RND 400ML --

## (undated) DEVICE — THE CANADY HYBRID PLASMA SCALPEL IS AN ELECTROSURGICAL PLASMA SCALPEL THAT USES AN 85MM BENDABLE PADDLE BLADE TIP. THE ELECTROSURGICAL PLASMA SCALPEL IS USED TO SIMULTANEOUSLY CUT AND COAGULATE BIOLOGICAL TISSUE.: Brand: CANADY HYBRID PLASMA PADDLE BLADE

## (undated) DEVICE — REM POLYHESIVE ADULT PATIENT RETURN ELECTRODE: Brand: VALLEYLAB

## (undated) DEVICE — SCREW EXT FIX L12MM FOR DISTRCTN

## (undated) DEVICE — GOWN,NON-REINFORCED,XXL: Brand: MEDLINE

## (undated) DEVICE — X-RAY SPONGES,12 PLY: Brand: DERMACEA

## (undated) DEVICE — (D)GLOVE SURG ORTH 7 PWD LTX -- DISC BY MFR USE ITEM 278013